# Patient Record
Sex: MALE | Race: ASIAN
[De-identification: names, ages, dates, MRNs, and addresses within clinical notes are randomized per-mention and may not be internally consistent; named-entity substitution may affect disease eponyms.]

---

## 2021-05-27 PROBLEM — Z00.00 ENCOUNTER FOR PREVENTIVE HEALTH EXAMINATION: Status: ACTIVE | Noted: 2021-05-27

## 2021-05-28 ENCOUNTER — APPOINTMENT (OUTPATIENT)
Dept: THORACIC SURGERY | Facility: CLINIC | Age: 71
End: 2021-05-28
Payer: MEDICARE

## 2021-05-28 ENCOUNTER — OUTPATIENT (OUTPATIENT)
Dept: OUTPATIENT SERVICES | Facility: HOSPITAL | Age: 71
LOS: 1 days | End: 2021-05-28
Payer: MEDICARE

## 2021-05-28 VITALS
HEART RATE: 82 BPM | HEIGHT: 65 IN | DIASTOLIC BLOOD PRESSURE: 73 MMHG | BODY MASS INDEX: 17.33 KG/M2 | OXYGEN SATURATION: 96 % | RESPIRATION RATE: 17 BRPM | SYSTOLIC BLOOD PRESSURE: 110 MMHG | WEIGHT: 104 LBS | TEMPERATURE: 96.9 F

## 2021-05-28 DIAGNOSIS — C16.9 MALIGNANT NEOPLASM OF STOMACH, UNSPECIFIED: ICD-10-CM

## 2021-05-28 DIAGNOSIS — Z87.891 PERSONAL HISTORY OF NICOTINE DEPENDENCE: ICD-10-CM

## 2021-05-28 DIAGNOSIS — Z01.818 ENCOUNTER FOR OTHER PREPROCEDURAL EXAMINATION: ICD-10-CM

## 2021-05-28 DIAGNOSIS — K92.2 GASTROINTESTINAL HEMORRHAGE, UNSPECIFIED: ICD-10-CM

## 2021-05-28 LAB
ALBUMIN SERPL ELPH-MCNC: 3.6 G/DL — SIGNIFICANT CHANGE UP (ref 3.3–5)
ALP SERPL-CCNC: 189 U/L — HIGH (ref 40–120)
ALT FLD-CCNC: 28 U/L — SIGNIFICANT CHANGE UP (ref 10–45)
ANION GAP SERPL CALC-SCNC: 8 MMOL/L — SIGNIFICANT CHANGE UP (ref 5–17)
APPEARANCE UR: CLEAR — SIGNIFICANT CHANGE UP
APTT BLD: 33.3 SEC — SIGNIFICANT CHANGE UP (ref 27.5–35.5)
AST SERPL-CCNC: 39 U/L — SIGNIFICANT CHANGE UP (ref 10–40)
BASOPHILS # BLD AUTO: 0.07 K/UL — SIGNIFICANT CHANGE UP (ref 0–0.2)
BASOPHILS NFR BLD AUTO: 0.7 % — SIGNIFICANT CHANGE UP (ref 0–2)
BILIRUB SERPL-MCNC: 0.3 MG/DL — SIGNIFICANT CHANGE UP (ref 0.2–1.2)
BILIRUB UR-MCNC: NEGATIVE — SIGNIFICANT CHANGE UP
BLD GP AB SCN SERPL QL: NEGATIVE — SIGNIFICANT CHANGE UP
BUN SERPL-MCNC: 18 MG/DL — SIGNIFICANT CHANGE UP (ref 7–23)
CALCIUM SERPL-MCNC: 9.1 MG/DL — SIGNIFICANT CHANGE UP (ref 8.4–10.5)
CHLORIDE SERPL-SCNC: 105 MMOL/L — SIGNIFICANT CHANGE UP (ref 96–108)
CHOLEST SERPL-MCNC: 148 MG/DL — SIGNIFICANT CHANGE UP
CO2 SERPL-SCNC: 29 MMOL/L — SIGNIFICANT CHANGE UP (ref 22–31)
COLOR SPEC: YELLOW — SIGNIFICANT CHANGE UP
CREAT SERPL-MCNC: 0.74 MG/DL — SIGNIFICANT CHANGE UP (ref 0.5–1.3)
DIFF PNL FLD: NEGATIVE — SIGNIFICANT CHANGE UP
EOSINOPHIL # BLD AUTO: 0.09 K/UL — SIGNIFICANT CHANGE UP (ref 0–0.5)
EOSINOPHIL NFR BLD AUTO: 0.9 % — SIGNIFICANT CHANGE UP (ref 0–6)
GLUCOSE SERPL-MCNC: 89 MG/DL — SIGNIFICANT CHANGE UP (ref 70–99)
GLUCOSE UR QL: NEGATIVE — SIGNIFICANT CHANGE UP
HCT VFR BLD CALC: 35.6 % — LOW (ref 39–50)
HDLC SERPL-MCNC: 48 MG/DL — SIGNIFICANT CHANGE UP
HGB BLD-MCNC: 10.8 G/DL — LOW (ref 13–17)
IMM GRANULOCYTES NFR BLD AUTO: 0.6 % — SIGNIFICANT CHANGE UP (ref 0–1.5)
INR BLD: 1.04 — SIGNIFICANT CHANGE UP (ref 0.88–1.16)
KETONES UR-MCNC: NEGATIVE — SIGNIFICANT CHANGE UP
LEUKOCYTE ESTERASE UR-ACNC: NEGATIVE — SIGNIFICANT CHANGE UP
LIPID PNL WITH DIRECT LDL SERPL: 85 MG/DL — SIGNIFICANT CHANGE UP
LYMPHOCYTES # BLD AUTO: 2.21 K/UL — SIGNIFICANT CHANGE UP (ref 1–3.3)
LYMPHOCYTES # BLD AUTO: 21.7 % — SIGNIFICANT CHANGE UP (ref 13–44)
MCHC RBC-ENTMCNC: 27.8 PG — SIGNIFICANT CHANGE UP (ref 27–34)
MCHC RBC-ENTMCNC: 30.3 GM/DL — LOW (ref 32–36)
MCV RBC AUTO: 91.5 FL — SIGNIFICANT CHANGE UP (ref 80–100)
MONOCYTES # BLD AUTO: 0.6 K/UL — SIGNIFICANT CHANGE UP (ref 0–0.9)
MONOCYTES NFR BLD AUTO: 5.9 % — SIGNIFICANT CHANGE UP (ref 2–14)
NEUTROPHILS # BLD AUTO: 7.14 K/UL — SIGNIFICANT CHANGE UP (ref 1.8–7.4)
NEUTROPHILS NFR BLD AUTO: 70.2 % — SIGNIFICANT CHANGE UP (ref 43–77)
NITRITE UR-MCNC: NEGATIVE — SIGNIFICANT CHANGE UP
NON HDL CHOLESTEROL: 100 MG/DL — SIGNIFICANT CHANGE UP
NRBC # BLD: 0 /100 WBCS — SIGNIFICANT CHANGE UP (ref 0–0)
PH UR: 6 — SIGNIFICANT CHANGE UP (ref 5–8)
PLATELET # BLD AUTO: 317 K/UL — SIGNIFICANT CHANGE UP (ref 150–400)
POTASSIUM SERPL-MCNC: 3.8 MMOL/L — SIGNIFICANT CHANGE UP (ref 3.5–5.3)
POTASSIUM SERPL-SCNC: 3.8 MMOL/L — SIGNIFICANT CHANGE UP (ref 3.5–5.3)
PROT SERPL-MCNC: 8.1 G/DL — SIGNIFICANT CHANGE UP (ref 6–8.3)
PROT UR-MCNC: NEGATIVE MG/DL — SIGNIFICANT CHANGE UP
PROTHROM AB SERPL-ACNC: 12.5 SEC — SIGNIFICANT CHANGE UP (ref 10.6–13.6)
RBC # BLD: 3.89 M/UL — LOW (ref 4.2–5.8)
RBC # FLD: 14 % — SIGNIFICANT CHANGE UP (ref 10.3–14.5)
RH IG SCN BLD-IMP: POSITIVE — SIGNIFICANT CHANGE UP
SODIUM SERPL-SCNC: 142 MMOL/L — SIGNIFICANT CHANGE UP (ref 135–145)
SP GR SPEC: >=1.03 — SIGNIFICANT CHANGE UP (ref 1–1.03)
TRIGL SERPL-MCNC: 74 MG/DL — SIGNIFICANT CHANGE UP
UROBILINOGEN FLD QL: 0.2 E.U./DL — SIGNIFICANT CHANGE UP
WBC # BLD: 10.17 K/UL — SIGNIFICANT CHANGE UP (ref 3.8–10.5)
WBC # FLD AUTO: 10.17 K/UL — SIGNIFICANT CHANGE UP (ref 3.8–10.5)

## 2021-05-28 PROCEDURE — 81003 URINALYSIS AUTO W/O SCOPE: CPT

## 2021-05-28 PROCEDURE — 86850 RBC ANTIBODY SCREEN: CPT

## 2021-05-28 PROCEDURE — 85730 THROMBOPLASTIN TIME PARTIAL: CPT

## 2021-05-28 PROCEDURE — 86901 BLOOD TYPING SEROLOGIC RH(D): CPT

## 2021-05-28 PROCEDURE — 99205 OFFICE O/P NEW HI 60 MIN: CPT

## 2021-05-28 PROCEDURE — 85025 COMPLETE CBC W/AUTO DIFF WBC: CPT

## 2021-05-28 PROCEDURE — 85610 PROTHROMBIN TIME: CPT

## 2021-05-28 PROCEDURE — 36415 COLL VENOUS BLD VENIPUNCTURE: CPT

## 2021-05-28 PROCEDURE — 80053 COMPREHEN METABOLIC PANEL: CPT

## 2021-05-28 PROCEDURE — 80061 LIPID PANEL: CPT

## 2021-05-28 PROCEDURE — 86900 BLOOD TYPING SEROLOGIC ABO: CPT

## 2021-05-28 RX ORDER — METOCLOPRAMIDE 5 MG/1
5 TABLET ORAL
Refills: 0 | Status: ACTIVE | COMMUNITY

## 2021-05-28 RX ORDER — ONDANSETRON HYDROCHLORIDE 24 MG/1
TABLET, FILM COATED ORAL
Refills: 0 | Status: ACTIVE | COMMUNITY

## 2021-05-28 RX ORDER — DICYCLOMINE HYDROCHLORIDE 10 MG/1
10 CAPSULE ORAL
Refills: 0 | Status: ACTIVE | COMMUNITY

## 2021-05-28 RX ORDER — OMEPRAZOLE 40 MG/1
40 CAPSULE, DELAYED RELEASE ORAL
Refills: 0 | Status: ACTIVE | COMMUNITY

## 2021-05-28 NOTE — ASSESSMENT
[FreeTextEntry1] : 70 year old male, Mandarin speaking, current smoker, with no significant PMHx,  recently admitted to  April 2021 to [facility] for GI bleed, perforated gastric ulcer s/p laparotomy, repair on 4/28/21 with pathology + for gastric adenocarcinoma c/b pelvic abscess s/p drain. Did not require blood transfusion. Recent weight loss of 15 lbs. He saw oncologist Dr. Chris Castellanos yesterday 5/27/21, H/H 10/29.9\par \par CT Abdomen/Pelvis with IV Contrast 4/19/21\par - No free air or intestinal obstruction. No intra-abdominal abscess\par - Perhaps mild thickening of the gastric antrum may reflect underlying peptic ulcer disease however limited without the presence of enteric contrast\par - Multiple hepatic cyst. \par \par MRI Abdomen w/ w/o 4/29/21\par - Numerous T2 hyperintense lesions are scattered through the liver, exhibiting no significant postcontrast enhancement, compatible with multiple hepatic cysts. \par - No definitive evidence of enhancing hepatic lesions suspicious for metastatic disease.\par - Again seen R>L pleural effusions\par - Compared with 4/25/21, there appears to be interval development of generalized small bowel distention, concerning for development of distal small bowel obstruction\par \par MRI Brain w/ w/o 4/29/21\par - No evidence of infarct, hemorrhage, or other acute intracranial abnormality.\par - No evidence of any intracranial mass lesion pathologic enhancement. \par - Scattered T-2 FLAIR hyperintensities are seen within the bilateral periventricular and left front juxtacortical white matter. Periventricular white matter lesions exhibit perpendicular orientation related to the ventricular wall, reminiscent of demyelinating lesions in the setting of multiple sclerosis\par \par CT Abdomen/Pelvis with IV Contrast 5/5/21\par - drainage catheter in place within the pelvis with near complete drainage of previous pelvic abscess collection. Persistent dilated small bowel loops seen within the pelvis measuring up to 4cm in caliber possible secondary to localized ileus or partial small bowel obstruction. Decrease in size of small amount of perihepatic and perisplenic ascites. Interval increase in size of moderate loculated right sided pleural effusion. Decrease in size of previous small left-sided pleural effusion. \par \par Patient tolerating PO intake without issues. Reports melena x past 2 days.  Denies abdominal pain, vomiting, dizziness. Advised patient to go to ED if worsening stomach pain, dysphagia, dizziness. Labs drawn today reviewed - H/H 10.8/35.6\par \par CT Abdomen/Pelvis with IV Contrast 4/19/21 and 5/5/21 reviewed with patient and his daughter, mildly thickened gastric antrum consistent peptic ulcer disease s/p surgery on 4/28/21. Patient with biopsy proven gastric adenocarcinoma. Will arrange PET and EUS with Dr. Menendez for tissue diagnosis and staging. \par \par I have reviewed the patient's medical records and diagnostic images at the time of this office consultation and have made the following recommendation.\par Plan:\par 1. PET\par 2. EUS with biopsy with Dr. Menendez\par 3. Labs drawn today\par 4. Request OP note, oncology note.

## 2021-05-28 NOTE — HISTORY OF PRESENT ILLNESS
[FreeTextEntry1] : 70 year old male, Mandarin speaking, current smoker, with no significant PMHx,  recently admitted to  April 2021 to [facility] for GI bleed,  perforated gastric ulcer s/p laparotomy, repair on 4/28/21 with pathology + for gastric adenocarcinoma c/b pelvic abscess s/p drain. Did not require blood transfusion. Recent weight loss of 15 lbs. He saw oncologist Dr. Chris Castellanos yesterday 5/27/21, H/H 10/29.9\par \par CT Abdomen/Pelvis with IV Contrast 4/19/21\par - No free air or intestinal obstruction. No intra-abdominal abscess\par - Perhaps mild thickening of the gastric antrum may reflect underlying peptic ulcer disease however limited without the presence of enteric contrast\par - Multiple hepatic cyst. \par \par MRI Abdomen w/ w/o 4/29/21\par - Numerous T2 hyperintense lesions are scattered through the liver, exhibiting no significant postcontrast enhancement, compatible with multiple hepatic cysts. \par - No definitive evidence of enhancing hepatic lesions suspicious for metastatic disease.\par - Again seen R>L pleural effusions\par - Compared with 4/25/21, there appears to be interval development of generalized small bowel distention, concerning for development of distal small bowel obstruction\par \par MRI Brain w/ w/o 4/29/21\par - No evidence of infarct, hemorrhage, or other acute intracranial abnormality.\par - No evidence of any intracranial mass lesion pathologic enhancement. \par - Scattered T-2 FLAIR hyperintensities are seen within the bilateral periventricular and left front juxtacortical white matter. Periventricular white matter lesions exhibit perpendicular orientation related to the ventricular wall, reminiscent of demyelinating lesions in the setting of multiple sclerosis\par \par CT Abdomen/Pelvis with IV Contrast 5/5/21\par - drainage catheter in place within the pelvis with near complete drainage of previous pelvic abscess collection. Persistent dilated small bowel loops seen within the pelvis measuring up to 4cm in caliber possible secondary to localized ileus or partial small bowel obstruction. Decrease in size of small amount of perihepatic and perisplenic ascites. Interval increase in size of moderate loculated right sided pleural effusion. Decrease in size of previous small left-sided pleural effusion.

## 2021-05-28 NOTE — END OF VISIT
[Time Spent: ___ minutes] : I have spent [unfilled] minutes of time on the encounter. [FreeTextEntry3] : I, USHA CUELLAR , am scribing for and in the presence of ELLIS ADRIAN the following sections: history of present illness, past medical/family/surgical/family/social history, review of systems, vital signs, physical exam, and disposition.\par  \par I personally performed the services described in the documentation, reviewed the documentation recorded by the scribe in my presence and it accurately and completely records my words and actions.

## 2021-05-28 NOTE — PHYSICAL EXAM
[General Appearance - Alert] : alert [General Appearance - In No Acute Distress] : in no acute distress [General Appearance - Well Nourished] : well nourished [General Appearance - Well Developed] : well developed [] : no respiratory distress [Respiration, Rhythm And Depth] : normal respiratory rhythm and effort [Exaggerated Use Of Accessory Muscles For Inspiration] : no accessory muscle use [Auscultation Breath Sounds / Voice Sounds] : lungs were clear to auscultation bilaterally [Apical Impulse] : the apical impulse was normal [Heart Sounds] : normal S1 and S2 [Examination Of The Chest] : the chest was normal in appearance [2+] : left 2+ [Abdomen Soft] : soft [Abdomen Tenderness] : non-tender [Abnormal Walk] : normal gait [Musculoskeletal - Swelling] : no joint swelling seen [No Focal Deficits] : no focal deficits [Oriented To Time, Place, And Person] : oriented to person, place, and time [FreeTextEntry1] : laparotomy scar

## 2021-05-28 NOTE — CONSULT LETTER
[Dear  ___] : Dear  [unfilled], [Consult Letter:] : I had the pleasure of evaluating your patient, [unfilled]. [Please see my note below.] : Please see my note below. [Consult Closing:] : Thank you very much for allowing me to participate in the care of this patient.  If you have any questions, please do not hesitate to contact me. [Sincerely,] : Sincerely, [FreeTextEntry3] : Roberto Newton MD\par Professor, Cardiovascular & Thoracic Surgery\par [employer]\par Director of the [facility] \par Director of Thoracic Surgery, [facility]\par \par [facility]\par [address]\par [facility] 4th Floor\par [place] [address]\par Phone: [phone]\par Fax: [phone]

## 2021-05-29 ENCOUNTER — INPATIENT (INPATIENT)
Facility: HOSPITAL | Age: 71
LOS: 3 days | Discharge: HOME | DRG: 374 | End: 2021-06-02
Attending: STUDENT IN AN ORGANIZED HEALTH CARE EDUCATION/TRAINING PROGRAM | Admitting: STUDENT IN AN ORGANIZED HEALTH CARE EDUCATION/TRAINING PROGRAM
Payer: COMMERCIAL

## 2021-05-29 VITALS
HEIGHT: 65 IN | WEIGHT: 104.06 LBS | SYSTOLIC BLOOD PRESSURE: 105 MMHG | TEMPERATURE: 97 F | DIASTOLIC BLOOD PRESSURE: 68 MMHG | HEART RATE: 85 BPM | OXYGEN SATURATION: 99 % | RESPIRATION RATE: 20 BRPM

## 2021-05-29 DIAGNOSIS — K92.2 GASTROINTESTINAL HEMORRHAGE, UNSPECIFIED: ICD-10-CM

## 2021-05-29 DIAGNOSIS — R74.01 ELEVATION OF LEVELS OF LIVER TRANSAMINASE LEVELS: ICD-10-CM

## 2021-05-29 DIAGNOSIS — Z29.9 ENCOUNTER FOR PROPHYLACTIC MEASURES, UNSPECIFIED: ICD-10-CM

## 2021-05-29 DIAGNOSIS — D64.9 ANEMIA, UNSPECIFIED: ICD-10-CM

## 2021-05-29 DIAGNOSIS — C16.9 MALIGNANT NEOPLASM OF STOMACH, UNSPECIFIED: ICD-10-CM

## 2021-05-29 LAB
ALBUMIN SERPL ELPH-MCNC: 2.6 G/DL — LOW (ref 3.5–5)
ALP SERPL-CCNC: 184 U/L — HIGH (ref 40–120)
ALT FLD-CCNC: 29 U/L DA — SIGNIFICANT CHANGE UP (ref 10–60)
ANION GAP SERPL CALC-SCNC: 5 MMOL/L — SIGNIFICANT CHANGE UP (ref 5–17)
APPEARANCE UR: CLEAR — SIGNIFICANT CHANGE UP
APTT BLD: 33.4 SEC — SIGNIFICANT CHANGE UP (ref 27.5–35.5)
AST SERPL-CCNC: 45 U/L — HIGH (ref 10–40)
BACTERIA # UR AUTO: ABNORMAL /HPF
BASOPHILS # BLD AUTO: 0.06 K/UL — SIGNIFICANT CHANGE UP (ref 0–0.2)
BASOPHILS # BLD AUTO: 0.07 K/UL — SIGNIFICANT CHANGE UP (ref 0–0.2)
BASOPHILS NFR BLD AUTO: 0.7 % — SIGNIFICANT CHANGE UP (ref 0–2)
BASOPHILS NFR BLD AUTO: 0.8 % — SIGNIFICANT CHANGE UP (ref 0–2)
BILIRUB SERPL-MCNC: 0.2 MG/DL — SIGNIFICANT CHANGE UP (ref 0.2–1.2)
BILIRUB UR-MCNC: NEGATIVE — SIGNIFICANT CHANGE UP
BUN SERPL-MCNC: 20 MG/DL — HIGH (ref 7–18)
CALCIUM SERPL-MCNC: 8.5 MG/DL — SIGNIFICANT CHANGE UP (ref 8.4–10.5)
CHLORIDE SERPL-SCNC: 108 MMOL/L — SIGNIFICANT CHANGE UP (ref 96–108)
CO2 SERPL-SCNC: 27 MMOL/L — SIGNIFICANT CHANGE UP (ref 22–31)
COD CRY URNS QL: ABNORMAL /HPF
COLOR SPEC: YELLOW — SIGNIFICANT CHANGE UP
COMMENT - URINE: SIGNIFICANT CHANGE UP
COMMENT - URINE: SIGNIFICANT CHANGE UP
CREAT SERPL-MCNC: 0.76 MG/DL — SIGNIFICANT CHANGE UP (ref 0.5–1.3)
DIFF PNL FLD: ABNORMAL
EOSINOPHIL # BLD AUTO: 0.05 K/UL — SIGNIFICANT CHANGE UP (ref 0–0.5)
EOSINOPHIL # BLD AUTO: 0.11 K/UL — SIGNIFICANT CHANGE UP (ref 0–0.5)
EOSINOPHIL NFR BLD AUTO: 0.6 % — SIGNIFICANT CHANGE UP (ref 0–6)
EOSINOPHIL NFR BLD AUTO: 1.3 % — SIGNIFICANT CHANGE UP (ref 0–6)
EPI CELLS # UR: ABNORMAL /HPF
GLUCOSE SERPL-MCNC: 124 MG/DL — HIGH (ref 70–99)
GLUCOSE UR QL: NEGATIVE — SIGNIFICANT CHANGE UP
HCT VFR BLD CALC: 29.4 % — LOW (ref 39–50)
HCT VFR BLD CALC: 31.3 % — LOW (ref 39–50)
HCT VFR BLD CALC: 32.3 % — LOW (ref 39–50)
HGB BLD-MCNC: 10.1 G/DL — LOW (ref 13–17)
HGB BLD-MCNC: 9.2 G/DL — LOW (ref 13–17)
HGB BLD-MCNC: 9.8 G/DL — LOW (ref 13–17)
IMM GRANULOCYTES NFR BLD AUTO: 0.3 % — SIGNIFICANT CHANGE UP (ref 0–1.5)
IMM GRANULOCYTES NFR BLD AUTO: 0.4 % — SIGNIFICANT CHANGE UP (ref 0–1.5)
INR BLD: 1.11 RATIO — SIGNIFICANT CHANGE UP (ref 0.88–1.16)
KETONES UR-MCNC: NEGATIVE — SIGNIFICANT CHANGE UP
LEUKOCYTE ESTERASE UR-ACNC: ABNORMAL
LIDOCAIN IGE QN: 213 U/L — SIGNIFICANT CHANGE UP (ref 73–393)
LYMPHOCYTES # BLD AUTO: 1.77 K/UL — SIGNIFICANT CHANGE UP (ref 1–3.3)
LYMPHOCYTES # BLD AUTO: 19.8 % — SIGNIFICANT CHANGE UP (ref 13–44)
LYMPHOCYTES # BLD AUTO: 2.21 K/UL — SIGNIFICANT CHANGE UP (ref 1–3.3)
LYMPHOCYTES # BLD AUTO: 25.2 % — SIGNIFICANT CHANGE UP (ref 13–44)
MCHC RBC-ENTMCNC: 27.9 PG — SIGNIFICANT CHANGE UP (ref 27–34)
MCHC RBC-ENTMCNC: 27.9 PG — SIGNIFICANT CHANGE UP (ref 27–34)
MCHC RBC-ENTMCNC: 28 PG — SIGNIFICANT CHANGE UP (ref 27–34)
MCHC RBC-ENTMCNC: 31.3 GM/DL — LOW (ref 32–36)
MCV RBC AUTO: 89.2 FL — SIGNIFICANT CHANGE UP (ref 80–100)
MCV RBC AUTO: 89.2 FL — SIGNIFICANT CHANGE UP (ref 80–100)
MCV RBC AUTO: 89.6 FL — SIGNIFICANT CHANGE UP (ref 80–100)
MONOCYTES # BLD AUTO: 0.52 K/UL — SIGNIFICANT CHANGE UP (ref 0–0.9)
MONOCYTES # BLD AUTO: 0.52 K/UL — SIGNIFICANT CHANGE UP (ref 0–0.9)
MONOCYTES NFR BLD AUTO: 5.8 % — SIGNIFICANT CHANGE UP (ref 2–14)
MONOCYTES NFR BLD AUTO: 5.9 % — SIGNIFICANT CHANGE UP (ref 2–14)
NEUTROPHILS # BLD AUTO: 5.84 K/UL — SIGNIFICANT CHANGE UP (ref 1.8–7.4)
NEUTROPHILS # BLD AUTO: 6.49 K/UL — SIGNIFICANT CHANGE UP (ref 1.8–7.4)
NEUTROPHILS NFR BLD AUTO: 66.5 % — SIGNIFICANT CHANGE UP (ref 43–77)
NEUTROPHILS NFR BLD AUTO: 72.7 % — SIGNIFICANT CHANGE UP (ref 43–77)
NITRITE UR-MCNC: NEGATIVE — SIGNIFICANT CHANGE UP
NRBC # BLD: 0 /100 WBCS — SIGNIFICANT CHANGE UP (ref 0–0)
OB PNL STL: POSITIVE
PH UR: 5 — SIGNIFICANT CHANGE UP (ref 5–8)
PLATELET # BLD AUTO: 259 K/UL — SIGNIFICANT CHANGE UP (ref 150–400)
PLATELET # BLD AUTO: 265 K/UL — SIGNIFICANT CHANGE UP (ref 150–400)
PLATELET # BLD AUTO: 285 K/UL — SIGNIFICANT CHANGE UP (ref 150–400)
POTASSIUM SERPL-MCNC: 3.9 MMOL/L — SIGNIFICANT CHANGE UP (ref 3.5–5.3)
POTASSIUM SERPL-SCNC: 3.9 MMOL/L — SIGNIFICANT CHANGE UP (ref 3.5–5.3)
PROT SERPL-MCNC: 7.3 G/DL — SIGNIFICANT CHANGE UP (ref 6–8.3)
PROT UR-MCNC: 30 MG/DL
PROTHROM AB SERPL-ACNC: 13.1 SEC — SIGNIFICANT CHANGE UP (ref 10.6–13.6)
RBC # BLD: 3.28 M/UL — LOW (ref 4.2–5.8)
RBC # BLD: 3.51 M/UL — LOW (ref 4.2–5.8)
RBC # BLD: 3.51 M/UL — LOW (ref 4.2–5.8)
RBC # BLD: 3.62 M/UL — LOW (ref 4.2–5.8)
RBC # FLD: 13.6 % — SIGNIFICANT CHANGE UP (ref 10.3–14.5)
RBC # FLD: 13.7 % — SIGNIFICANT CHANGE UP (ref 10.3–14.5)
RBC # FLD: 13.9 % — SIGNIFICANT CHANGE UP (ref 10.3–14.5)
RBC CASTS # UR COMP ASSIST: ABNORMAL /HPF (ref 0–2)
RETICS #: 62.1 K/UL — SIGNIFICANT CHANGE UP (ref 25–125)
RETICS/RBC NFR: 1.8 % — SIGNIFICANT CHANGE UP (ref 0.5–2.5)
SARS-COV-2 RNA SPEC QL NAA+PROBE: SIGNIFICANT CHANGE UP
SODIUM SERPL-SCNC: 140 MMOL/L — SIGNIFICANT CHANGE UP (ref 135–145)
SP GR SPEC: 1.03 — HIGH (ref 1.01–1.02)
UROBILINOGEN FLD QL: NEGATIVE — SIGNIFICANT CHANGE UP
WBC # BLD: 7.95 K/UL — SIGNIFICANT CHANGE UP (ref 3.8–10.5)
WBC # BLD: 8.78 K/UL — SIGNIFICANT CHANGE UP (ref 3.8–10.5)
WBC # BLD: 8.93 K/UL — SIGNIFICANT CHANGE UP (ref 3.8–10.5)
WBC # FLD AUTO: 7.95 K/UL — SIGNIFICANT CHANGE UP (ref 3.8–10.5)
WBC # FLD AUTO: 8.78 K/UL — SIGNIFICANT CHANGE UP (ref 3.8–10.5)
WBC # FLD AUTO: 8.93 K/UL — SIGNIFICANT CHANGE UP (ref 3.8–10.5)
WBC UR QL: SIGNIFICANT CHANGE UP /HPF (ref 0–5)

## 2021-05-29 PROCEDURE — 99223 1ST HOSP IP/OBS HIGH 75: CPT

## 2021-05-29 PROCEDURE — 99285 EMERGENCY DEPT VISIT HI MDM: CPT

## 2021-05-29 RX ORDER — TRAMADOL HYDROCHLORIDE 50 MG/1
50 TABLET ORAL EVERY 6 HOURS
Refills: 0 | Status: DISCONTINUED | OUTPATIENT
Start: 2021-05-29 | End: 2021-06-02

## 2021-05-29 RX ORDER — SODIUM CHLORIDE 9 MG/ML
1000 INJECTION INTRAMUSCULAR; INTRAVENOUS; SUBCUTANEOUS ONCE
Refills: 0 | Status: COMPLETED | OUTPATIENT
Start: 2021-05-29 | End: 2021-05-29

## 2021-05-29 RX ORDER — ACETAMINOPHEN 500 MG
650 TABLET ORAL EVERY 4 HOURS
Refills: 0 | Status: DISCONTINUED | OUTPATIENT
Start: 2021-05-29 | End: 2021-06-02

## 2021-05-29 RX ORDER — SODIUM CHLORIDE 9 MG/ML
1000 INJECTION INTRAMUSCULAR; INTRAVENOUS; SUBCUTANEOUS
Refills: 0 | Status: DISCONTINUED | OUTPATIENT
Start: 2021-05-29 | End: 2021-06-02

## 2021-05-29 RX ORDER — PANTOPRAZOLE SODIUM 20 MG/1
80 TABLET, DELAYED RELEASE ORAL ONCE
Refills: 0 | Status: COMPLETED | OUTPATIENT
Start: 2021-05-29 | End: 2021-05-29

## 2021-05-29 RX ORDER — OXYCODONE HYDROCHLORIDE 5 MG/1
5 TABLET ORAL EVERY 8 HOURS
Refills: 0 | Status: DISCONTINUED | OUTPATIENT
Start: 2021-05-29 | End: 2021-06-02

## 2021-05-29 RX ORDER — PANTOPRAZOLE SODIUM 20 MG/1
40 TABLET, DELAYED RELEASE ORAL
Refills: 0 | Status: DISCONTINUED | OUTPATIENT
Start: 2021-05-30 | End: 2021-06-02

## 2021-05-29 RX ADMIN — OXYCODONE HYDROCHLORIDE 5 MILLIGRAM(S): 5 TABLET ORAL at 23:29

## 2021-05-29 RX ADMIN — PANTOPRAZOLE SODIUM 80 MILLIGRAM(S): 20 TABLET, DELAYED RELEASE ORAL at 21:49

## 2021-05-29 RX ADMIN — SODIUM CHLORIDE 1000 MILLILITER(S): 9 INJECTION INTRAMUSCULAR; INTRAVENOUS; SUBCUTANEOUS at 16:45

## 2021-05-29 NOTE — H&P ADULT - PROBLEM SELECTOR PLAN 5
IMPROVE VTE Individual Risk Assessment  RISK                                                         Points  [  ] Previous VTE                                      3  [  ] Thrombophilia                                   2  [  ] Lower limb paralysis                         2 (unable to hold up >15 seconds)    [  ] Current Cancer                                  2       (within 6 months)  [x  ] Immobilization > 24 hrs                    1  [  ] ICU/CCU stay > 24 hrs                         1  [  ] Age > 60                                              1  scd and ppi for ppx

## 2021-05-29 NOTE — H&P ADULT - NSHPSOCIALHISTORY_GEN_ALL_CORE
Alcohol: Denied  Smoking: smoked for 45 years and quit in april after surgery   Illicit drugs: Denied

## 2021-05-29 NOTE — H&P ADULT - PROBLEM SELECTOR PLAN 1
Pt with recent hx of GIB in april s/p ex laparotomy for perforated gastric ulcer that turned out to be gastric cancer  pw dark stools  Hgb 10->9.2; unknown baseline   will send anemia panel   goal hgb > 7; will hold off any transfusion for now  s/p PPI 80 mg x1 ; will cw 40 iv bid   will cw zofran prn for nausea   will keep NPo with iv hydration   will monitor cbc q8h Pt with recent hx of GIB in april s/p ex laparotomy for perforated gastric ulcer that turned out to be gastric cancer  pw dark stools  Hgb 10->9.2; unknown baseline   CT angio showed no active bleeding   will send anemia panel   goal hgb > 7; will hold off any transfusion for now  s/p PPI 80 mg x1 ; will cw 40 iv bid   will cw zofran prn for nausea   will keep NPo with iv hydration   will monitor cbc q8h

## 2021-05-29 NOTE — H&P ADULT - NSHPPHYSICALEXAM_GEN_ALL_CORE
Vital Signs Last 24 Hrs  T(C): 36.8 (29 May 2021 20:33), Max: 36.8 (29 May 2021 20:33)  T(F): 98.2 (29 May 2021 20:33), Max: 98.2 (29 May 2021 20:33)  HR: 75 (29 May 2021 20:33) (75 - 85)  BP: 105/60 (29 May 2021 20:33) (105/60 - 105/68)  BP(mean): --  RR: 20 (29 May 2021 20:33) (20 - 20)  SpO2: 99% (29 May 2021 20:33) (99% - 99%)    Physical exam:  GENERAL: NAD, lying in bed comfortably  HEAD:  Atraumatic, Normocephalic  EYES: EOMI, PERRLA, conjunctiva and sclera clear  ENT: Moist mucous membranes  NECK: Supple, No JVD  CHEST/LUNG: Clear to auscultation bilaterally; No rales, rhonchi, wheezing, or rubs.  HEART: Regular rate and rhythm; S1+ S2+   ABDOMEN: Bowel sounds present; Soft, diffusely tender to palpate, Nondistended   EXTREMITIES:  2+ Peripheral Pulses, brisk capillary refill. No clubbing, cyanosis, or edema  NERVOUS SYSTEM:  Alert & Oriented , speech clear   MSK: FROM all 4 extremities, full and equal strength  SKIN: post surgical scar in mid abdomen, healing well.

## 2021-05-29 NOTE — ED PROVIDER NOTE - PHYSICAL EXAMINATION
Very thin, cachectic, in NAD  Moist mucosae  Pink conjunctivae  Lungs clear  Cardiac w RRR, no JVD  Abdomen soft, mild tenderness to lower abdomen, digital rectal exam with some brown/dark stool   No CVAT  No pedal edema, no calf TTP  AAOx3, no gross neuro deficits

## 2021-05-29 NOTE — ED PROVIDER NOTE - CLINICAL SUMMARY MEDICAL DECISION MAKING FREE TEXT BOX
Possible upper GI bleed vs surgical complication. Will get basic blood work, CT abdomen, and re-evaluate. Low threshold for admission.

## 2021-05-29 NOTE — H&P ADULT - PROBLEM SELECTOR PLAN 4
Pt pw alk phos of 184, mildly elevated ast   CT showed multiple hepatic cysts   will send hepatitis panel

## 2021-05-29 NOTE — ED PROVIDER NOTE - OBJECTIVE STATEMENT
69 y/o male h/o recent exploratory laparotomy for perforated gastric ulcer that turned out to be gastric cancer and also possible complication of surgery with peritonitis with 2 hospitalizations in [place] in the month of April, presenting today with dark stools since yesterday associated with nausea and lower abdominal pain. No fever. No chills. No other GI, , or respiratory symptoms. Has been following up with oncologist and surgeon in the [place] area. Scheduled for PET scans in mid-June.

## 2021-05-29 NOTE — H&P ADULT - ASSESSMENT
71 yo ex-smoker male with recent hx of GIB and ex laparotomy for perforated gastric ulcer that turned out to be gastric cancer and also possible complication of surgery with peritonitis with 2 hospitalizations in [place] in the month of April, presenting today with dark stools since yesterday associated with nausea and lower abdominal pain.

## 2021-05-29 NOTE — H&P ADULT - HISTORY OF PRESENT ILLNESS
71 yo ex-smoker male with recent hx of GIB and ex laparotomy for perforated gastric ulcer that turned out to be gastric cancer and also possible complication of surgery with peritonitis with 2 hospitalizations in [place] in the month of April, presenting today with dark stools since yesterday associated with nausea and lower abdominal pain.  Pt states pain in in lower abdomen since the last surgery and he feels bloated 10-12 hours a day. Pt has had wt loss of aprox 30 lbs. Pt has been following up with oncologist and surgeon in the [place] area and is scheduled for PET scans in mid-June. Pt denies fever chills, sob, chest pain or any other symptoms.

## 2021-05-29 NOTE — H&P ADULT - ATTENDING COMMENTS
Patient is a 70 year old male with a PMH of perforated gastric ulcer, Gastric Cancer who was BIBEMS due to abdominal pain.  Patient states that he was recently hospitalized in [place] due to abdominal pain with GI bleeding and was later found to have a GI mass. Patient states that as he has continued to have abdominal pain he decided to call EMS.    Of note, patient endorses an unintentional 30 pound weight loss over the past several months.    At time of examination in the ED, patient endorses abdominal pain.  However, patient denies any headache, dizziness, chest pain, palpitations, shortness of breath, nausea/vomiting/diarrhea.    T(C): 36.8 (05-29-21 @ 20:33), Max: 36.8 (05-29-21 @ 20:33)  T(F): 98.2 (05-29-21 @ 20:33), Max: 98.2 (05-29-21 @ 20:33)  HR: 75 (05-29-21 @ 20:33) (75 - 85)  BP: 105/60 (05-29-21 @ 20:33) (105/60 - 105/68)  RR: 20 (05-29-21 @ 20:33) (20 - 20)  SpO2: 99% (05-29-21 @ 20:33) (99% - 99%)  Wt(kg): --    P/E: As above MAR    A/P:    Abdominal Pain likely due to Peritoneal Carcinomatosis:  -CTA Abdomen/Pelvis with IV contrast identified Large malignant gastric ulcer within a large gastric mass on the anterior wall of the body of the stomach measuring 6.7 x 3.3 x 4.7 cm, No evidence of active extravasation of contrast material to suggest acute GI bleed, Omental carcinomatosis and peritoneal carcinomatosis  -FOBT= Positive  -Hgb= 9.2 <-- 10.1  -MCV= 89.6, RDW= 89.2  -Will send Iron Panel (Iron, TIBC, Ferritin) and Retic Count as add-ons to already resulted CBC  -PPI BID  -Will continue with IVF hydration  -NPO (except meds), for now until GI evaluation  -Will continue to closely monitor CBC and coags Q8H  -Will certainly need to involve GI for further recommendations    Gastric Cancer with Peritoneal Carcinomatosis:  -As above  -Will need to involve Oncology for further recommendations  -Will also likely need to involve Palliative Care    Elevated Alk Phos and Transaminitis:  -Likely due to metastatic disease process  -Will send GGT and Hepatitis Panel    Uncontrolled Blood Glucose:  -Hemoglobin A1c with AM labs  -Blood Glucose Monitoring ACHS  -Regular Insulin Sliding Scale ACHS  -NPO (except meds), for now until GI evaluation    Hypoalbuminemia:  -Nutrition Consult    GI/DVT PPx:  -Intermittent Compression Stockings  -PPI BID Patient is a 70 year old male with a PMH of perforated gastric ulcer, Gastric Cancer who was BIBEMS due to abdominal pain.  Patient states that he was recently hospitalized in [place] due to abdominal pain with GI bleeding and was later found to have a GI mass. Patient states that as he has continued to have abdominal pain he decided to call EMS.    Of note, patient endorses an unintentional 30 pound weight loss over the past several months.    At time of examination in the ED, patient endorses abdominal pain.  However, patient denies any headache, dizziness, chest pain, palpitations, shortness of breath, nausea/vomiting/diarrhea.    T(C): 36.8 (05-29-21 @ 20:33), Max: 36.8 (05-29-21 @ 20:33)  T(F): 98.2 (05-29-21 @ 20:33), Max: 98.2 (05-29-21 @ 20:33)  HR: 75 (05-29-21 @ 20:33) (75 - 85)  BP: 105/60 (05-29-21 @ 20:33) (105/60 - 105/68)  RR: 20 (05-29-21 @ 20:33) (20 - 20)  SpO2: 99% (05-29-21 @ 20:33) (99% - 99%)  Wt(kg): --    P/E: As above MAR    A/P:    Abdominal Pain likely due to Peritoneal Carcinomatosis:  -CTA Abdomen/Pelvis with IV contrast identified Large malignant gastric ulcer within a large gastric mass on the anterior wall of the body of the stomach measuring 6.7 x 3.3 x 4.7 cm, No evidence of active extravasation of contrast material to suggest acute GI bleed, Omental carcinomatosis and peritoneal carcinomatosis  -FOBT= Positive  -Hgb= 9.2 <-- 10.1  -MCV= 89.6, RDW= 89.2  -Will send Iron Panel (Iron, TIBC, Ferritin) and Retic Count as add-ons to already resulted CBC  -PPI BID  -Will continue with IVF hydration  -NPO (except meds), for now until GI evaluation  -Will continue to closely monitor CBC and coags Q8H  -Will certainly need to involve GI for further recommendations    Gastric Cancer with Peritoneal Carcinomatosis:  -As above  -Will send CEA, CA 19-9 levels  -Will need to involve Oncology for further recommendations  -Will also likely need to involve Palliative Care    Elevated Alk Phos and Transaminitis:  -Likely due to metastatic disease process  -Will send GGT and Hepatitis Panel    Uncontrolled Blood Glucose:  -Hemoglobin A1c with AM labs  -Blood Glucose Monitoring ACHS  -Regular Insulin Sliding Scale ACHS  -NPO (except meds), for now until GI evaluation    Hypoalbuminemia:  -Nutrition Consult    GI/DVT PPx:  -Intermittent Compression Stockings  -PPI BID Patient is a 70 year old male with a PMH of perforated gastric ulcer, Gastric Cancer who was BIBEMS due to abdominal pain.  Patient states that he was recently hospitalized in [place] due to abdominal pain with GI bleeding and was later found to have a GI mass. Patient states that as he has continued to have abdominal pain he decided to call EMS.    Of note, patient endorses an unintentional 30 pound weight loss over the past several months.    At time of examination in the ED, patient endorses abdominal pain.  However, patient denies any headache, dizziness, chest pain, palpitations, shortness of breath, nausea/vomiting/diarrhea.    T(C): 36.8 (05-29-21 @ 20:33), Max: 36.8 (05-29-21 @ 20:33)  T(F): 98.2 (05-29-21 @ 20:33), Max: 98.2 (05-29-21 @ 20:33)  HR: 75 (05-29-21 @ 20:33) (75 - 85)  BP: 105/60 (05-29-21 @ 20:33) (105/60 - 105/68)  RR: 20 (05-29-21 @ 20:33) (20 - 20)  SpO2: 99% (05-29-21 @ 20:33) (99% - 99%)  Wt(kg): --    P/E: As above MAR    A/P:    Abdominal Pain likely due to Peritoneal Carcinomatosis:  -CTA Abdomen/Pelvis with IV contrast identified Large malignant gastric ulcer within a large gastric mass on the anterior wall of the body of the stomach measuring 6.7 x 3.3 x 4.7 cm, No evidence of active extravasation of contrast material to suggest acute GI bleed, Omental carcinomatosis and peritoneal carcinomatosis  -FOBT= Positive  -Hgb= 9.2 <-- 10.1  -MCV= 89.6, RDW= 89.2  -Will send Iron Panel (Iron, TIBC, Ferritin) and Retic Count as add-ons to already resulted CBC  -PPI BID  -Will continue with IVF hydration  -NPO (except meds), for now until GI evaluation  -Will continue to closely monitor CBC and coags Q8H  -Will certainly need to involve GI for further recommendations    Acute GI Bleeding:  -As above    Gastric Cancer with Peritoneal Carcinomatosis:  -As above  -Will send CEA, CA 19-9 levels  -Will need to involve Oncology for further recommendations  -Will also likely need to involve Palliative Care    Elevated Alk Phos and Transaminitis:  -Likely due to metastatic disease process  -Will send GGT and Hepatitis Panel    Uncontrolled Blood Glucose:  -Hemoglobin A1c with AM labs  -Blood Glucose Monitoring ACHS  -Regular Insulin Sliding Scale ACHS  -NPO (except meds), for now until GI evaluation    Hypoalbuminemia:  -Nutrition Consult    GI/DVT PPx:  -Intermittent Compression Stockings  -PPI BID

## 2021-05-29 NOTE — H&P ADULT - PROBLEM SELECTOR PLAN 2
Pt has been following up with oncologist and surgeon in the [place] area and is scheduled for PET scans in mid-June.   will send CEA, ca 19-9  will monitor for now and pt can fu with heme/onc as outpt Pt has been following up with oncologist and surgeon in the [place] area and is scheduled for PET scans in mid-June.   CT showed Large malignant gastric ulcer within a large gastric mass on the anterior wall of the body of the stomach measuring 6.7 x 3.3 x 4.7 cm. Omental carcinomatosis and peritoneal carcinomatosis. Retroperitoneal LEFT periaortic lymphadenopathy.  will send CEA, ca 19-9  will monitor for now and pt can fu with heme/onc as outpt

## 2021-05-30 ENCOUNTER — TRANSCRIPTION ENCOUNTER (OUTPATIENT)
Age: 71
End: 2021-05-30

## 2021-05-30 DIAGNOSIS — Z98.890 OTHER SPECIFIED POSTPROCEDURAL STATES: Chronic | ICD-10-CM

## 2021-05-30 LAB
A1C WITH ESTIMATED AVERAGE GLUCOSE RESULT: 5.9 % — HIGH (ref 4–5.6)
ANION GAP SERPL CALC-SCNC: 9 MMOL/L — SIGNIFICANT CHANGE UP (ref 5–17)
BUN SERPL-MCNC: 14 MG/DL — SIGNIFICANT CHANGE UP (ref 7–18)
CALCIUM SERPL-MCNC: 8.4 MG/DL — SIGNIFICANT CHANGE UP (ref 8.4–10.5)
CANCER AG19-9 SERPL-ACNC: <2 U/ML — SIGNIFICANT CHANGE UP
CEA SERPL-MCNC: 9095 NG/ML — HIGH (ref 0–3.8)
CHLORIDE SERPL-SCNC: 109 MMOL/L — HIGH (ref 96–108)
CHOLEST SERPL-MCNC: 142 MG/DL — SIGNIFICANT CHANGE UP
CO2 SERPL-SCNC: 25 MMOL/L — SIGNIFICANT CHANGE UP (ref 22–31)
COVID-19 SPIKE DOMAIN AB INTERP: NEGATIVE — SIGNIFICANT CHANGE UP
COVID-19 SPIKE DOMAIN ANTIBODY RESULT: 0.4 U/ML — SIGNIFICANT CHANGE UP
CREAT SERPL-MCNC: 0.46 MG/DL — LOW (ref 0.5–1.3)
CULTURE RESULTS: SIGNIFICANT CHANGE UP
ESTIMATED AVERAGE GLUCOSE: 123 MG/DL — HIGH (ref 68–114)
FERRITIN SERPL-MCNC: 229 NG/ML — SIGNIFICANT CHANGE UP (ref 30–400)
FERRITIN SERPL-MCNC: 273 NG/ML — SIGNIFICANT CHANGE UP (ref 30–400)
FOLATE SERPL-MCNC: 9.3 NG/ML — SIGNIFICANT CHANGE UP
GLUCOSE BLDC GLUCOMTR-MCNC: 82 MG/DL — SIGNIFICANT CHANGE UP (ref 70–99)
GLUCOSE SERPL-MCNC: 80 MG/DL — SIGNIFICANT CHANGE UP (ref 70–99)
HAV IGM SER-ACNC: SIGNIFICANT CHANGE UP
HBV CORE IGM SER-ACNC: SIGNIFICANT CHANGE UP
HBV SURFACE AG SER-ACNC: SIGNIFICANT CHANGE UP
HCT VFR BLD CALC: 32.7 % — LOW (ref 39–50)
HCV AB S/CO SERPL IA: 0.43 S/CO — SIGNIFICANT CHANGE UP (ref 0–0.99)
HCV AB SERPL-IMP: SIGNIFICANT CHANGE UP
HDLC SERPL-MCNC: 42 MG/DL — SIGNIFICANT CHANGE UP
HGB BLD-MCNC: 10.3 G/DL — LOW (ref 13–17)
IRON SATN MFR SERPL: 12 % — LOW (ref 20–55)
IRON SATN MFR SERPL: 18 % — LOW (ref 20–55)
IRON SATN MFR SERPL: 27 UG/DL — LOW (ref 65–170)
IRON SATN MFR SERPL: 35 UG/DL — LOW (ref 65–170)
LDH SERPL L TO P-CCNC: 178 U/L — SIGNIFICANT CHANGE UP (ref 120–225)
LIPID PNL WITH DIRECT LDL SERPL: 83 MG/DL — SIGNIFICANT CHANGE UP
MAGNESIUM SERPL-MCNC: 2 MG/DL — SIGNIFICANT CHANGE UP (ref 1.6–2.6)
MCHC RBC-ENTMCNC: 28.1 PG — SIGNIFICANT CHANGE UP (ref 27–34)
MCHC RBC-ENTMCNC: 31.5 GM/DL — LOW (ref 32–36)
MCV RBC AUTO: 89.1 FL — SIGNIFICANT CHANGE UP (ref 80–100)
NON HDL CHOLESTEROL: 100 MG/DL — SIGNIFICANT CHANGE UP
NRBC # BLD: 0 /100 WBCS — SIGNIFICANT CHANGE UP (ref 0–0)
PHOSPHATE SERPL-MCNC: 2.9 MG/DL — SIGNIFICANT CHANGE UP (ref 2.5–4.5)
PLATELET # BLD AUTO: 304 K/UL — SIGNIFICANT CHANGE UP (ref 150–400)
POTASSIUM SERPL-MCNC: 3.5 MMOL/L — SIGNIFICANT CHANGE UP (ref 3.5–5.3)
POTASSIUM SERPL-SCNC: 3.5 MMOL/L — SIGNIFICANT CHANGE UP (ref 3.5–5.3)
RBC # BLD: 3.67 M/UL — LOW (ref 4.2–5.8)
RBC # BLD: 3.67 M/UL — LOW (ref 4.2–5.8)
RBC # FLD: 13.8 % — SIGNIFICANT CHANGE UP (ref 10.3–14.5)
RETICS #: 71.2 K/UL — SIGNIFICANT CHANGE UP (ref 25–125)
RETICS/RBC NFR: 1.9 % — SIGNIFICANT CHANGE UP (ref 0.5–2.5)
SARS-COV-2 IGG+IGM SERPL QL IA: 0.4 U/ML — SIGNIFICANT CHANGE UP
SARS-COV-2 IGG+IGM SERPL QL IA: NEGATIVE — SIGNIFICANT CHANGE UP
SODIUM SERPL-SCNC: 143 MMOL/L — SIGNIFICANT CHANGE UP (ref 135–145)
SPECIMEN SOURCE: SIGNIFICANT CHANGE UP
TIBC SERPL-MCNC: 197 UG/DL — LOW (ref 250–450)
TIBC SERPL-MCNC: 216 UG/DL — LOW (ref 250–450)
TRIGL SERPL-MCNC: 83 MG/DL — SIGNIFICANT CHANGE UP
TROPONIN I SERPL-MCNC: <0.015 NG/ML — SIGNIFICANT CHANGE UP (ref 0–0.04)
TSH SERPL-MCNC: 0.95 UU/ML — SIGNIFICANT CHANGE UP (ref 0.34–4.82)
UIBC SERPL-MCNC: 162 UG/DL — SIGNIFICANT CHANGE UP (ref 110–370)
UIBC SERPL-MCNC: 189 UG/DL — SIGNIFICANT CHANGE UP (ref 110–370)
VIT B12 SERPL-MCNC: 1231 PG/ML — SIGNIFICANT CHANGE UP (ref 232–1245)
WBC # BLD: 8.15 K/UL — SIGNIFICANT CHANGE UP (ref 3.8–10.5)
WBC # FLD AUTO: 8.15 K/UL — SIGNIFICANT CHANGE UP (ref 3.8–10.5)

## 2021-05-30 PROCEDURE — 99233 SBSQ HOSP IP/OBS HIGH 50: CPT

## 2021-05-30 RX ORDER — SENNA PLUS 8.6 MG/1
2 TABLET ORAL AT BEDTIME
Refills: 0 | Status: DISCONTINUED | OUTPATIENT
Start: 2021-05-30 | End: 2021-06-02

## 2021-05-30 RX ORDER — LANOLIN ALCOHOL/MO/W.PET/CERES
5 CREAM (GRAM) TOPICAL AT BEDTIME
Refills: 0 | Status: DISCONTINUED | OUTPATIENT
Start: 2021-05-30 | End: 2021-06-02

## 2021-05-30 RX ORDER — ONDANSETRON 8 MG/1
4 TABLET, FILM COATED ORAL EVERY 6 HOURS
Refills: 0 | Status: DISCONTINUED | OUTPATIENT
Start: 2021-05-30 | End: 2021-06-02

## 2021-05-30 RX ORDER — SIMETHICONE 80 MG/1
80 TABLET, CHEWABLE ORAL
Refills: 0 | Status: COMPLETED | OUTPATIENT
Start: 2021-05-30 | End: 2021-06-02

## 2021-05-30 RX ORDER — SUCRALFATE 1 G
1 TABLET ORAL
Refills: 0 | Status: DISCONTINUED | OUTPATIENT
Start: 2021-05-30 | End: 2021-05-31

## 2021-05-30 RX ORDER — CALCIUM CARBONATE 500(1250)
1 TABLET ORAL THREE TIMES A DAY
Refills: 0 | Status: DISCONTINUED | OUTPATIENT
Start: 2021-05-30 | End: 2021-06-02

## 2021-05-30 RX ADMIN — OXYCODONE HYDROCHLORIDE 5 MILLIGRAM(S): 5 TABLET ORAL at 23:22

## 2021-05-30 RX ADMIN — SODIUM CHLORIDE 70 MILLILITER(S): 9 INJECTION INTRAMUSCULAR; INTRAVENOUS; SUBCUTANEOUS at 12:33

## 2021-05-30 RX ADMIN — PANTOPRAZOLE SODIUM 40 MILLIGRAM(S): 20 TABLET, DELAYED RELEASE ORAL at 06:19

## 2021-05-30 RX ADMIN — OXYCODONE HYDROCHLORIDE 5 MILLIGRAM(S): 5 TABLET ORAL at 22:19

## 2021-05-30 RX ADMIN — OXYCODONE HYDROCHLORIDE 5 MILLIGRAM(S): 5 TABLET ORAL at 07:16

## 2021-05-30 RX ADMIN — Medication 5 MILLIGRAM(S): at 22:19

## 2021-05-30 RX ADMIN — SENNA PLUS 2 TABLET(S): 8.6 TABLET ORAL at 22:19

## 2021-05-30 RX ADMIN — SIMETHICONE 80 MILLIGRAM(S): 80 TABLET, CHEWABLE ORAL at 17:51

## 2021-05-30 RX ADMIN — Medication 1 GRAM(S): at 17:51

## 2021-05-30 RX ADMIN — OXYCODONE HYDROCHLORIDE 5 MILLIGRAM(S): 5 TABLET ORAL at 06:21

## 2021-05-30 RX ADMIN — PANTOPRAZOLE SODIUM 40 MILLIGRAM(S): 20 TABLET, DELAYED RELEASE ORAL at 17:29

## 2021-05-30 NOTE — PROGRESS NOTE ADULT - SUBJECTIVE AND OBJECTIVE BOX
Janet Magallon MD  Division of Hospital Medicine  Pager: [phone] (NS)/[phone] ([facility])    24 HOUR EVENTS/ROS: Currently pt still reporting dark stool. No F/C, abdominal pain, vomiting. Does report reduced appetite.    MEDICATIONS:  acetaminophen   Tablet .. 650 milliGRAM(s) Oral every 4 hours PRN  oxyCODONE    IR 5 milliGRAM(s) Oral every 8 hours PRN  traMADol 50 milliGRAM(s) Oral every 6 hours PRN  pantoprazole  Injectable 40 milliGRAM(s) IV Push two times a day  sodium chloride 0.9%. 1000 milliLiter(s) IV Continuous <Continuous>    PHYSICAL EXAM: reviewed vitals personally  T(C): 36.9 (05-30-21 @ 13:56), Max: 36.9 (05-30-21 @ 13:56)  HR: 73 (05-30-21 @ 13:56) (72 - 85)  BP: 115/64 (05-30-21 @ 13:56) (105/60 - 116/69)  RR: 18 (05-30-21 @ 13:56) (16 - 20)  SpO2: 99% (05-30-21 @ 13:56) (97% - 99%)  Wt(kg): --  Daily Height in cm: 165.1 (29 May 2021 15:52)    Daily   I&O's Summary    Appearance: NAD thin  male sitting up in bed, mildly jaundiced	  HEENT:   Normal oral mucosa, PERRL, EOMI	  Lymphatic: No lymphadenopathy  Cardiovascular: Normal S1 S2, No JVD, No murmurs, No edema  Respiratory: Lungs clear to auscultation	  Neuro/psych: Grossly non-focal, CN 2-12 intact, AAOX3, mood and affect normal  Gastrointestinal:  Soft, Non-tender, + BS	  Skin: No rashes, No ecchymoses, No cyanosis	  Extremities: Normal range of motion, No clubbing, cyanosis or edema  Vascular: Peripheral pulses palpable 2+ bilaterally    LABS:	  reviewed personally - anemic, CEA high, A1C 5.9%, TIBC, iron low, trops neg 	                        10.3   8.15  )-----------( 304      ( 30 May 2021 08:26 )             32.7     05-30    143  |  109<H>  |  14  ----------------------------<  80  3.5   |  25  |  0.46<L>  05-29    140  |  108  |  20<H>  ----------------------------<  124<H>  3.9   |  27  |  0.76    Ca    8.4      30 May 2021 08:26  Ca    8.5      29 May 2021 16:46  Phos  2.9     05-30  Mg     2.0     05-30    TPro  7.3  /  Alb  2.6<L>  /  TBili  0.2  /  DBili  x   /  AST  45<H>  /  ALT  29  /  AlkPhos  184<H>  05-29      BCX/UCX:   Coags: PT/INR - ( 29 May 2021 20:24 )   PT: 13.1 sec;   INR: 1.11 ratio         PTT - ( 29 May 2021 20:24 )  PTT:33.4 sec    CARDIAC MARKERS:  Troponin I, Serum: <0.015 ng/mL (05-30 @ 08:26)    CTA abdomen/pelvis  1.  Large malignant gastric ulcer within a large gastric mass on the anterior wall of the body of the stomach measuring 6.7 x 3.3 x 4.7 cm.  2.  No evidence of active extravasation of contrast material to suggest acute GI bleed.  3.  Omental carcinomatosis and peritoneal carcinomatosis as described above.  4.  Retroperitoneal LEFT periaortic lymphadenopathy.

## 2021-05-30 NOTE — PROGRESS NOTE ADULT - ASSESSMENT
69 yo M, ex-smoker, with recent hx of GIB and ex laparotomy for perforated gastric ulcer that turned out to be gastric cancer and also possible complication of surgery with peritonitis with 2 hospitalizations in [place] in the month of April, presenting with melena/GIB. Likely 2/2 metastatic GI cancer.    #Gastric cancer, metastatic  #Carcinomatosis  #GIB  #Anemia  -likely metastatic ca; CEA high, per records, pt has oncologist and surgeon - scheduled for PET scans  -will need surgery and onc input re: amenability to surgical or chemo intervention - will consult  -for now c/w protonix, serial CBCs, transfuse for Hgb <7  -c/w fluids    #Prediabetes  -pt NPO for now; will likely not require HISS    #DVT PPX  -SCDs    #Dispo  -full code  -medicine for management of anemia 2/2 GIB/gastric CA 69 yo M, ex-smoker, with recent hx of GIB and ex laparotomy for perforated gastric ulcer that turned out to be gastric cancer and also possible complication of surgery with peritonitis with 2 hospitalizations in [place] in the month of April, presenting with melena/GIB. Likely 2/2 metastatic GI cancer.    #Gastric cancer, metastatic  #Carcinomatosis  #GIB  #Anemia  -likely metastatic ca; CEA high, per records, pt has oncologist and surgeon - scheduled for PET scans  -will need surgery and onc input re: amenability to surgical or chemo intervention - will consult  -also would benefit from palliative consult  -for now c/w protonix, serial CBCs, transfuse for Hgb <7  -c/w fluids    #Prediabetes  -pt NPO for now; will likely not require HISS    #DVT PPX  -SCDs    #Dispo  -full code  -medicine for management of anemia 2/2 GIB/gastric CA  -discussed with daughter Xochitl 71 yo M, ex-smoker, with recent hx of GIB and ex laparotomy for perforated gastric ulcer that turned out to be gastric cancer and also possible complication of surgery with peritonitis with 2 hospitalizations in [place] in the month of April, presenting with melena/GIB. Likely 2/2 metastatic GI cancer.    #Gastric cancer, metastatic  #Carcinomatosis  #GIB  #Anemia  -likely metastatic ca; CEA high, per records, pt has oncologist and surgeon - scheduled for PET scans  -will need surgery and onc input re: amenability to surgical or chemo intervention - will consult  -also would benefit from palliative consult  -for now c/w protonix, serial CBCs, transfuse for Hgb <7  -c/w fluids    #Prediabetes  -A1C 5.9%; will likely not require HISS    #DVT PPX  -SCDs    #Dispo  -full code  -medicine for management of anemia 2/2 GIB/gastric CA  -discussed with daughter Xochitl

## 2021-05-30 NOTE — PATIENT PROFILE ADULT - TRANSPORTATION
"Chief Complaint   Patient presents with     RECHECK     pre op jamie mast DOS 7/31       Vitals:    07/21/20 1405   Weight: 61.2 kg (135 lb)   Height: 1.576 m (5' 2.05\")       Body mass index is 24.65 kg/m .    Alan Dozier, EMT    " no

## 2021-05-30 NOTE — DISCHARGE NOTE PROVIDER - NSDCFUADDAPPT_GEN_ALL_CORE_FT
follow up with oncology dr. Christopher this Thursday 6/3.21, office will call for time.  follow up with oncology dr. Christopher this Thursday 6/3/21, office will call for time.

## 2021-05-30 NOTE — DISCHARGE NOTE PROVIDER - HOSPITAL COURSE
71 yo ex-smoker male with recent hx of GIB and ex laparotomy for perforated gastric ulcer that turned out to be gastric cancer and also possible complication of surgery with peritonitis with 2 hospitalizations in [place] in the month of April, presenting today with dark stools since yesterday associated with nausea and lower abdominal pain. In ED, Hgb 10->9.2; unknown baseline. CT angio showed no active bleeding. Anemia panel was sent. Was started on PPI, BID, Zofran PRN for Nausea, was kept NPO & was started on IV hydration. CBC was monitored Q8hrs.         >>>>>>>>>>>>>>>>>>>>>>>>>>>>>>>>>>>>>>>>>>>>>>>>>INCOMPLETE>>>>>>>>>>>>>>>>>>>>>>>>>>>>>>>>>>>>>>>>>>>>>     71 yo ex-smoker male with recent hx of GIB and ex laparotomy for perforated gastric ulcer that turned out to be gastric cancer and also possible complication of surgery with peritonitis with 2 hospitalizations in [place] in the month of April, presenting today with dark stools since yesterday associated with nausea and lower abdominal pain.  Pt is admitted for LGIB, weight loss. CT abd shows metastatic dx with peritoneal carcinomatosis. Heme/onc [facility] following. Surgery consulted and no acute surgical intervention needed this time. Palliative following. CT chest/head ordered for distal mets. will likely d/c home when CT is negative.      69 yo ex-smoker male with recent hx of GIB and ex laparotomy for perforated gastric ulcer that turned out to be gastric cancer and also possible complication of surgery with peritonitis with 2 hospitalizations in [place] in the month of April, presenting today with dark stools since yesterday associated with nausea and lower abdominal pain.  Pt is admitted for LGIB, weight loss. CT abd shows Large malignant gastric ulcer within a large gastric mass on the anterior wall of the body of the stomach measuring 6.7 x 3.3 x 4.7 cm. No evidence of active extravasation of contrast material to suggest acute GI bleed.Omental carcinomatosis and peritoneal carcinomatosis as described above.Retroperitoneal LEFT periaortic lymphadenopathy. CEA=9,095, CA 19-9 nl. Heme/onc [facility] following. Surgery consulted and no acute surgical intervention needed this time. Palliative following.   CT chest/head ordered for distal mets. result shows Hilar adenopathy, presumably metastatic. Bilateral pleural effusions with underlying compressive atelectasis of the lower lobes. Emphysema with dominant left apical bulla. Upper abdominal adenopathy. consistent with recent CT result.  Heme/onc dr. Christopher planed for chemo/immunotherapy to avoid bowel obstruction. Scheduled 6/3/21.  Pt is examined at bedside, feeling better, reported loose BM x 2 this morning. no abdominal pain or distention on exam. Tolerated well on advanced diet.   Pt is medically optimized for discharge home with outpt follow up with oncology as discussed with attending physician.   This is a brief summary of hospitalization and please refer to medical records/progress notes for completed hospital course. 71 yo ex-smoker male with recent hx of GIB and ex laparotomy for perforated gastric ulcer that turned out to be gastric cancer and also possible complication of surgery with peritonitis with 2 hospitalizations in [place] in the month of April, presenting today with dark stools since yesterday associated with nausea and lower abdominal pain. Pt admitted for LGIB, weight loss found to be likely 2/2 malignant gastric cancer with peritoneal carcinomatosis. CT abd shows Large malignant gastric ulcer within a large gastric mass on the anterior wall of the body of the stomach measuring 6.7 x 3.3 x 4.7 cm. No evidence of active extravasation of contrast material to suggest acute GI bleed.Omental carcinomatosis and peritoneal carcinomatosis as described above.Retroperitoneal LEFT periaortic lymphadenopathy. CEA=9,095, CA 19-9 nl. Heme/onc [facility] following. Surgery consulted and no acute surgical intervention needed this time.     Palliative following.     CT chest/head ordered for distal mets. result shows Hilar adenopathy, presumably metastatic. Bilateral pleural effusions with underlying compressive atelectasis of the lower lobes. Emphysema with dominant left apical bulla. Upper abdominal adenopathy. consistent with recent CT result.  Heme/onc dr. Christopher planed for chemo/immunotherapy to avoid bowel obstruction. Scheduled 6/3/21.  Pt is examined at bedside, feeling better, reported loose BM x 2 this morning. no abdominal pain or distention on exam. Tolerated well on advanced diet. Pt was evaluated by heme/onc, who determined he would be candidate for outpt chemo based on biopsy results in [place]. Surgery evaluated pt - no acute surgical intervention.    Pt is medically optimized for discharge home with outpt follow up with oncology as discussed with attending physician.   This is a brief summary of hospitalization and please refer to medical records/progress notes for completed hospital course.

## 2021-05-30 NOTE — CONSULT NOTE ADULT - SUBJECTIVE AND OBJECTIVE BOX
69 yo ex-smoker male with recent hx of GIB and ex laparotomy for perforated gastric ulcer that turned out to be gastric cancer and also possible complication of surgery with peritonitis with 2 hospitalizations in [place] in the month of April, presenting today with dark stools associated with nausea and lower abdominal pain.  Pt states pain is in lower abdomen since the last surgery and he feels bloated 10-12 hours a day. Pt has had wt loss of aprox 30 lbs. His  CT abd/pelvic  shows an extensive gastric mass with omental and peritoneal carcinomatosis.1.  Large malignant gastric ulcer within a large gastric mass on the anterior wall of the body of the stomach measuring 6.7 x 3.3 x 4.7 cm.  2.  No evidence of active extravasation of contrast material to suggest acute GI bleed.  3.  Omental carcinomatosis and peritoneal carcinomatosis as described above.  4.  Retroperitoneal LEFT periaortic lymphadenopathy.     Oncology consult for further workup and treatment. pt denied active bleeding now and c/o abd pain/discomfort    PMH/PSH as above  Fx denied oncology h/o  05-30    143  |  109<H>  |  14  ----------------------------<  80  3.5   |  25  |  0.46<L>    Ca    8.4      30 May 2021 08:26  Phos  2.9     05-30  Mg     2.0     05-30    TPro  7.3  /  Alb  2.6<L>  /  TBili  0.2  /  DBili  x   /  AST  45<H>  /  ALT  29  /  AlkPhos  184<H>  05-29  SH no ETOH but ex-smoker quit in 4/2021    PEVital Signs Last 24 Hrs  T(C): 36.9 (30 May 2021 13:56), Max: 36.9 (30 May 2021 13:56)  T(F): 98.4 (30 May 2021 13:56), Max: 98.4 (30 May 2021 13:56)  HR: 73 (30 May 2021 13:56) (72 - 79)  BP: 115/64 (30 May 2021 13:56) (110/66 - 116/69)  BP(mean): --  RR: 18 (30 May 2021 13:56) (16 - 18)  SpO2: 99% (30 May 2021 13:56) (97% - 99%)    MEDICATIONS  (STANDING):  melatonin 5 milliGRAM(s) Oral at bedtime  pantoprazole  Injectable 40 milliGRAM(s) IV Push two times a day  senna 2 Tablet(s) Oral at bedtime  simethicone 80 milliGRAM(s) Chew two times a day  sodium chloride 0.9%. 1000 milliLiter(s) (70 mL/Hr) IV Continuous <Continuous>  sucralfate 1 Gram(s) Oral four times a day  general cachexia but AAOx3 NAD   abd distended with possible masses c/w peritoneal carcinomatosis   extrem no edema   neurology grossly intact   CBC Full  -  ( 30 May 2021 08:26 )  WBC Count : 8.15 K/uL  RBC Count : 3.67 M/uL  Hemoglobin : 10.3 g/dL  Hematocrit : 32.7 %  Platelet Count - Automated : 304 K/uL  Mean Cell Volume : 89.1 fl  Mean Cell Hemoglobin : 28.1 pg  Mean Cell Hemoglobin Concentration : 31.5 gm/dL  Auto Neutrophil # : x  Auto Lymphocyte # : x  Auto Monocyte # : x  Auto Eosinophil # : x  Auto Basophil # : x  Auto Neutrophil % : x  Auto Lymphocyte % : x  Auto Monocyte % : x  Auto Eosinophil % : x  Auto Basophil % : x  05-30    143  |  109<H>  |  14  ----------------------------<  80  3.5   |  25  |  0.46<L>    Ca    8.4      30 May 2021 08:26  Phos  2.9     05-30  Mg     2.0     05-30    TPro  7.3  /  Alb  2.6<L>  /  TBili  0.2  /  DBili  x   /  AST  45<H>  /  ALT  29  /  AlkPhos  184<H>  05-29    Home Medications:  MEDICATIONS  (STANDING):  melatonin 5 milliGRAM(s) Oral at bedtime  pantoprazole  Injectable 40 milliGRAM(s) IV Push two times a day  senna 2 Tablet(s) Oral at bedtime  simethicone 80 milliGRAM(s) Chew two times a day  sodium chloride 0.9%. 1000 milliLiter(s) (70 mL/Hr) IV Continuous <Continuous>  sucralfate 1 Gram(s) Oral four times a day

## 2021-05-30 NOTE — DISCHARGE NOTE PROVIDER - CARE PROVIDER_API CALL
Rosemary Christopher  HEMATOLOGY  [address]  [place] [address]  Phone: [phone]  Fax: [phone]  Follow Up Time: 1 week   Rosemary Christopher  [facility]  [address]  [place] [address]  Phone: [phone]  Fax: [phone]  Follow Up Time: 1 week    Harriet Chua  Phone: [phone]  Fax: (   )    -  Follow Up Time: Routine

## 2021-05-30 NOTE — DISCHARGE NOTE PROVIDER - NSDCCPCAREPLAN_GEN_ALL_CORE_FT
PRINCIPAL DISCHARGE DIAGNOSIS  Diagnosis: Gastrointestinal hemorrhage, unspecified gastrointestinal hemorrhage type  Assessment and Plan of Treatment: You were followed for recent GIB with gastric ulcer that turned out to be gastric cancer. Hemoglobin level stable. no active bleeding reported on CT of abdomen. CT showed Large malignant gastric ulcer within a large gastric mass on the anterior wall of the body of the stomach measuring 6.7 x 3.3 x 4.7 cm. Omental carcinomatosis and peritoneal carcinomatosis. Retroperitoneal LEFT periaortic lymphadenopathy.  Follow up with your primary doctor and heme/oncology after discharge.      SECONDARY DISCHARGE DIAGNOSES  Diagnosis: Gastric cancer  Assessment and Plan of Treatment: CT showed Large malignant gastric ulcer within a large gastric mass on the anterior wall of the body of the stomach measuring 6.7 x 3.3 x 4.7 cm. Omental carcinomatosis and peritoneal carcinomatosis. Retroperitoneal LEFT periaortic lymphadenopathy.  You were followed by palliative team, oncology and surgery team. no acute surgical intervention needed at this time.   continue pain medication as prescribed.  continue bowel regimen to avoid bowel obstruction.   coninue to follow up with oncology dr. Christopher on 6/3 Thursday for plan of chemotherapy.    Diagnosis: Anemia  Assessment and Plan of Treatment: Due to GI bleed. Your hemoglobin level stable around 10.8. Symptoms to report, bleeding, palpitations, fatigue, pale skin, cold skin, dizziness. Take medications as ordered by PCP      Diagnosis: Transaminitis  Assessment and Plan of Treatment: Your liver enzymes were elevated. CT abdomen resulted multiple hepatic cysts. Hepatitis negative. Follow up with your primary doctor to monitor liver enzyme     PRINCIPAL DISCHARGE DIAGNOSIS  Diagnosis: Gastrointestinal hemorrhage, unspecified gastrointestinal hemorrhage type  Assessment and Plan of Treatment: You were followed for recent GIB with gastric ulcer that turned out to be gastric cancer. Hemoglobin level stable. no active bleeding reported on CT of abdomen. CT showed Large malignant gastric ulcer within a large gastric mass on the anterior wall of the body of the stomach measuring 6.7 x 3.3 x 4.7 cm. Omental carcinomatosis and peritoneal carcinomatosis. Retroperitoneal LEFT periaortic lymphadenopathy.  underwent CT chest and head to check metastasis. Result showsHilar adenopathy, presumably metastatic. Bilateral pleural effusions with underlying compressive atelectasis of the lower lobes.  Emphysema with dominant left apical bulla.  You were followed by heme/oncology.   continue iron tablet as recommended.   Follow up with your primary doctor and heme/oncology after discharge.   Scheduled on 6/3/21 with dr. Christopher. Office will call for time.      SECONDARY DISCHARGE DIAGNOSES  Diagnosis: Gastric cancer  Assessment and Plan of Treatment: CT showed Large malignant gastric ulcer within a large gastric mass on the anterior wall of the body of the stomach measuring 6.7 x 3.3 x 4.7 cm. Omental carcinomatosis and peritoneal carcinomatosis. Retroperitoneal LEFT periaortic lymphadenopathy.  You were followed by palliative team, oncology and surgery team. no acute surgical intervention needed at this time.   underwent CT chest and head to check metastasis. Result showsHilar adenopathy, presumably metastatic. Bilateral pleural effusions with underlying compressive atelectasis of the lower lobes.  Emphysema with dominant left apical bulla.  continue pain medication as prescribed.  continue bowel regimen to avoid bowel obstruction.   coninue to follow up with oncology dr. Christopher on 6/3 Thursday for plan of starting preparation chemotherapy.    Diagnosis: Transaminitis  Assessment and Plan of Treatment: Your liver enzymes were elevated. CT abdomen resulted multiple hepatic cysts. Hepatitis negative. Follow up with your primary doctor to monitor liver enzyme    Diagnosis: Anemia  Assessment and Plan of Treatment: Due to GI bleed. Your hemoglobin level stable around 10.8. Symptoms to report, bleeding, palpitations, fatigue, pale skin, cold skin, dizziness. Take medications as ordered by PCP

## 2021-05-30 NOTE — DISCHARGE NOTE PROVIDER - NSDCTIMEBILLING_GEN_ALL_CORE
minutes spent on total encounter; more than 50% of the visit was spent counseling and / or coordinating care by the attending physician.    The necessity of the time spent during the encounter on this date of service was due to:

## 2021-05-30 NOTE — CONSULT NOTE ADULT - ASSESSMENT
FULL NOTE TO FOLLOW  69 yo ex-smoker male with recent hx of GIB and ex laparotomy for perforated gastric ulcer that turned out to be gastric cancer and also possible complication of surgery with peritonitis with 2 hospitalizations in [place] in the month of April, presenting today with dark stools since yesterday associated with nausea and lower abdominal pain. Surgical oncology was consulted because pt's CTA done to evaluate GI shows an extensive gastric mass with omental and peritoneal carcinomatosis. . Additional history from daughter and son (Kelly Cole #[id number]). Additional lab work and test results from [place] will be provided tomorrow AM. CEA is 9095    f/up labs inc TM   Heme/Onc consult  Palliative consult   Other care/mgmt per primary team   Surgical oncology will follow 69 yo ex-smoker male with recent hx of GIB and ex laparotomy for perforated gastric ulcer that turned out to be gastric cancer and also possible complication of surgery with peritonitis with 2 hospitalizations in [place] in the month of April, presenting today with dark stools since yesterday associated with nausea and lower abdominal pain. Surgical oncology was consulted because pt's CTA done to evaluate GI shows an extensive gastric mass with omental and peritoneal carcinomatosis. . Additional history from daughter and son (Kelly Cole #[phone]). Additional lab work and test results from [place] will be provided tomorrow AM. CEA is 9095. Pt has metastatic disease    f/up labs inc TM   Will benefit IR Biopsy of the gastric mass/carcinomatosis/lymphadenopathy  Heme/Onc consult  Palliative consult   Other care/ mgmt per primary team   Surgical oncology will follow

## 2021-05-30 NOTE — DISCHARGE NOTE PROVIDER - PROVIDER TOKENS
PROVIDER:[TOKEN:[[id number]],FOLLOWUP:[1 week]] PROVIDER:[TOKEN:[[id number]],FOLLOWUP:[1 week]],FREE:[LAST:[Toma],FIRST:[Harriet],PHONE:[phone]],FAX:[(   [phone]    -],ADDRESS:[PMD],FOLLOWUP:[Routine]]

## 2021-05-30 NOTE — CONSULT NOTE ADULT - SUBJECTIVE AND OBJECTIVE BOX
SURGICAL ONCOLOGY CONSULT NOTE     chief complaint of Dark stool (30 May 2021 15:10)      HPI:  69 yo ex-smoker male with recent hx of GIB and ex laparotomy for perforated gastric ulcer that turned out to be gastric cancer and also possible complication of surgery with peritonitis with 2 hospitalizations in [place] in the month of April, presenting today with dark stools since yesterday associated with nausea and lower abdominal pain.  Pt states pain in in lower abdomen since the last surgery and he feels bloated 10-12 hours a day. Pt has had wt loss of aprox 30 lbs. Pt has been following up with oncologist and surgeon in the [place] area and is scheduled for PET scans in mid-. Pt denies fever chills, sob, chest pain or any other symptoms.  (29 May 2021 22:44)      PAST MEDICAL & SURGICAL HISTORY:  Gastric bleeding    Gastric mass        Review of Systems:    I have reviewed 9 systems with the patient and the only positive findings were     MEDICATIONS  (STANDING):  melatonin 5 milliGRAM(s) Oral at bedtime  pantoprazole  Injectable 40 milliGRAM(s) IV Push two times a day  senna 2 Tablet(s) Oral at bedtime  simethicone 80 milliGRAM(s) Chew two times a day  sodium chloride 0.9%. 1000 milliLiter(s) (70 mL/Hr) IV Continuous <Continuous>  sucralfate 1 Gram(s) Oral four times a day    MEDICATIONS  (PRN):  acetaminophen   Tablet .. 650 milliGRAM(s) Oral every 4 hours PRN Mild Pain (1 - 3)  calcium carbonate    500 mG (Tums) Chewable 1 Tablet(s) Chew three times a day PRN Heartburn  ondansetron Injectable 4 milliGRAM(s) IV Push every 6 hours PRN Nausea and/or Vomiting  oxyCODONE    IR 5 milliGRAM(s) Oral every 8 hours PRN Severe Pain (7 - 10)  traMADol 50 milliGRAM(s) Oral every 6 hours PRN Moderate Pain (4 - 6)      Allergies    No Known Allergies    Intolerances        Social History:  Alcohol: Denied  Smoking: smoked for 45 years and quit in april after surgery   Illicit drugs: Denied (29 May 2021 22:44)      FAMILY HISTORY:      Vital Signs Last 24 Hrs  T(C): 36.9 (30 May 2021 13:56), Max: 36.9 (30 May 2021 13:56)  T(F): 98.4 (30 May 2021 13:56), Max: 98.4 (30 May 2021 13:56)  HR: 73 (30 May 2021 13:56) (72 - 79)  BP: 115/64 (30 May 2021 13:56) (105/60 - 116/69)  BP(mean): --  RR: 18 (30 May 2021 13:56) (16 - 20)  SpO2: 99% (30 May 2021 13:56) (97% - 99%)    Physical Exam:  Vital Signs Last 24 Hrs  T(C): 36.9 (30 May 2021 13:56), Max: 36.9 (30 May 2021 13:56)  T(F): 98.4 (30 May 2021 13:56), Max: 98.4 (30 May 2021 13:56)  HR: 73 (30 May 2021 13:56) (72 - 79)  BP: 115/64 (30 May 2021 13:56) (105/60 - 116/69)  BP(mean): --  RR: 18 (30 May 2021 13:56) (16 - 20)  SpO2: 99% (30 May 2021 13:56) (97% - 99%)    General:  A&Ox3,Appears stated age, No acute distress,  Head: NC/AT  EENT: PERRLA. EOMI. Conjunctiva and sclera clear. Pharynx clear.  Neck: Supple. No JVD  Lungs: CTA B/l. Nonlabored Respirations  CV: +S1S2, RRR  Abdomen: Soft, Nondistended,  Nontender, no guarding, no rebound  Extremities: Warm and well perfused. 2+ peripheral pulses b/l. Calf soft, nontender b/l. No pedal edema.            LABS:                        10.3   8.15  )-----------( 304      ( 30 May 2021 08:26 )             32.7     05-30    143  |  109<H>  |  14  ----------------------------<  80  3.5   |  25  |  0.46<L>    Ca    8.4      30 May 2021 08:26  Phos  2.9     05-30  Mg     2.0     05-30    TPro  7.3  /  Alb  2.6<L>  /  TBili  0.2  /  DBili  x   /  AST  45<H>  /  ALT  29  /  AlkPhos  184<H>  05-29    LIVER FUNCTIONS - ( 29 May 2021 16:46 )  Alb: 2.6 g/dL / Pro: 7.3 g/dL / ALK PHOS: 184 U/L / ALT: 29 U/L DA / AST: 45 U/L / GGT: x           PT/INR - ( 29 May 2021 20:24 )   PT: 13.1 sec;   INR: 1.11 ratio         PTT - ( 29 May 2021 20:24 )  PTT:33.4 sec  Urinalysis Basic - ( 29 May 2021 16:59 )    Color: Yellow / Appearance: Clear / S.030 / pH: x  Gluc: x / Ketone: Negative  / Bili: Negative / Urobili: Negative   Blood: x / Protein: 30 mg/dL / Nitrite: Negative   Leuk Esterase: Trace / RBC: 2-5 /HPF / WBC 3-5 /HPF   Sq Epi: x / Non Sq Epi: Occasional /HPF / Bacteria: Few /HPF        RADIOLOGY & ADDITIONAL STUDIES:  SURGICAL ONCOLOGY CONSULT NOTE     chief complaint of Dark stool (30 May 2021 15:10)      HPI:  71 yo ex-smoker male with recent hx of GIB and ex laparotomy for perforated gastric ulcer that turned out to be gastric cancer and also possible complication of surgery with peritonitis with 2 hospitalizations in [place] in the month of April, presenting today with dark stools since yesterday associated with nausea and lower abdominal pain.  Pt states pain is in lower abdomen since the last surgery and he feels bloated 10-12 hours a day. Pt has had wt loss of aprox 30 lbs. Pt has been following up with oncologist and surgeon in the [place] area and is scheduled for PET scans in mid-. Pt denies fever chills, sob, chest pain or any other symptoms.  (29 May 2021 22:44)    Surgical oncology was consulted because pt's CTA done to evaluate GI shows an extensive gastric mass with omental and peritoneal carcinomatosis. pt presently admits non specific abdominal pain, nausea but no vomiting . Additional history from daughter and son (Kelly Cole #[id number]). Additional lab work and test results from [place] will be provided tomorrow AM. No other complaints at this time        PAST MEDICAL & SURGICAL HISTORY:  Gastric bleeding    Gastric mass        Review of Systems:    I have reviewed 9 systems with the patient and the only positive findings were     MEDICATIONS  (STANDING):  melatonin 5 milliGRAM(s) Oral at bedtime  pantoprazole  Injectable 40 milliGRAM(s) IV Push two times a day  senna 2 Tablet(s) Oral at bedtime  simethicone 80 milliGRAM(s) Chew two times a day  sodium chloride 0.9%. 1000 milliLiter(s) (70 mL/Hr) IV Continuous <Continuous>  sucralfate 1 Gram(s) Oral four times a day    MEDICATIONS  (PRN):  acetaminophen   Tablet .. 650 milliGRAM(s) Oral every 4 hours PRN Mild Pain (1 - 3)  calcium carbonate    500 mG (Tums) Chewable 1 Tablet(s) Chew three times a day PRN Heartburn  ondansetron Injectable 4 milliGRAM(s) IV Push every 6 hours PRN Nausea and/or Vomiting  oxyCODONE    IR 5 milliGRAM(s) Oral every 8 hours PRN Severe Pain (7 - 10)  traMADol 50 milliGRAM(s) Oral every 6 hours PRN Moderate Pain (4 - 6)      Allergies    No Known Allergies    Intolerances        Social History:  Alcohol: Denied  Smoking: smoked for 45 years and quit in april after surgery   Illicit drugs: Denied (29 May 2021 22:44)      FAMILY HISTORY:      Vital Signs Last 24 Hrs  T(C): 36.9 (30 May 2021 13:56), Max: 36.9 (30 May 2021 13:56)  T(F): 98.4 (30 May 2021 13:56), Max: 98.4 (30 May 2021 13:56)  HR: 73 (30 May 2021 13:56) (72 - 79)  BP: 115/64 (30 May 2021 13:56) (105/60 - 116/69)  BP(mean): --  RR: 18 (30 May 2021 13:56) (16 - 20)  SpO2: 99% (30 May 2021 13:56) (97% - 99%)    Physical Exam:  Vital Signs Last 24 Hrs  T(C): 36.9 (30 May 2021 13:56), Max: 36.9 (30 May 2021 13:56)  T(F): 98.4 (30 May 2021 13:56), Max: 98.4 (30 May 2021 13:56)  HR: 73 (30 May 2021 13:56) (72 - 79)  BP: 115/64 (30 May 2021 13:56) (105/60 - 116/69)  BP(mean): --  RR: 18 (30 May 2021 13:56) (16 - 20)  SpO2: 99% (30 May 2021 13:56) (97% - 99%)    General:  A&Ox3,Appears stated age, No acute distress,  Head: NC/AT  EENT: PERRLA. EOMI. Conjunctiva and sclera clear. Pharynx clear.  Neck: Supple. No JVD  Lungs: CTA B/l. Nonlabored Respirations  CV: +S1S2, RRR  Abdomen: Soft, Nondistended,  Nontender, no guarding, no rebound  Extremities: Warm and well perfused. 2+ peripheral pulses b/l. Calf soft, nontender b/l. No pedal edema.            LABS:                        10.3   8.15  )-----------( 304      ( 30 May 2021 08:26 )             32.7     05-30    143  |  109<H>  |  14  ----------------------------<  80  3.5   |  25  |  0.46<L>    Ca    8.4      30 May 2021 08:26  Phos  2.9       Mg     2.0         TPro  7.3  /  Alb  2.6<L>  /  TBili  0.2  /  DBili  x   /  AST  45<H>  /  ALT  29  /  AlkPhos  184<H>      LIVER FUNCTIONS - ( 29 May 2021 16:46 )  Alb: 2.6 g/dL / Pro: 7.3 g/dL / ALK PHOS: 184 U/L / ALT: 29 U/L DA / AST: 45 U/L / GGT: x           PT/INR - ( 29 May 2021 20:24 )   PT: 13.1 sec;   INR: 1.11 ratio         PTT - ( 29 May 2021 20:24 )  PTT:33.4 sec  Urinalysis Basic - ( 29 May 2021 16:59 )    Color: Yellow / Appearance: Clear / S.030 / pH: x  Gluc: x / Ketone: Negative  / Bili: Negative / Urobili: Negative   Blood: x / Protein: 30 mg/dL / Nitrite: Negative   Leuk Esterase: Trace / RBC: 2-5 /HPF / WBC 3-5 /HPF   Sq Epi: x / Non Sq Epi: Occasional /HPF / Bacteria: Few /HPF        RADIOLOGY & ADDITIONAL STUDIES:    < from: CT Angio Abdomen and Pelvis w/ IV Cont (21 @ 19:06) >  FINDINGS:  LOWER CHEST: RIGHT lower lobe atelectasis. RIGHT pleural effusion.    LIVER: Multiple hepatic cysts.  BILE DUCTS: Normal caliber.  GALLBLADDER: Within normal limits.  SPLEEN: Within normal limits.  PANCREAS: Within normal limits.  ADRENALS: Within normal limits.  KIDNEYS/URETERS: Within normal limits.    BLADDER: Within normal limits.  REPRODUCTIVE ORGANS: Enlarged prostate.    BOWEL: Markedly abnormal stomach with large anterior gastric wall mass with ulceration. The mass measures 6.7 x 3.3 x 4.7 cm. Evaluation is somewhat limited by extensive respiratory motion artifact. No evidence of active extravasation of contrast material to suggest acute GI bleed.  There are multiple nodules within the gastrocolic ligament/lesser omentum consistent with carcinomatosis. For example there is a 10 mm nodule seen on the RIGHT (11-63). Additional omental nodule measures 1.6 x 1.2 cm, also to the RIGHT of midline (11-57). Probable peritoneal implants along the RIGHT paracolic gutter. Large peripancreatic lymph node versus peritoneal implant between the duodenal bulb and head of the pancreas (11-44). Malignant portacaval lymphadenopathy.    No evidence of bowel obstruction. Appendix is not visualized. No evidence of inflammation in the pericecal region.  PERITONEUM: Multiple peritoneal implants as described above. Peritoneal carcinomatosis.  VESSELS: Within normal limits.  RETROPERITONEUM/LYMPH NODES: LEFT para-aortic lymphadenopathy at the level of the LEFT renal vein.  ABDOMINAL WALL: Within normal limits.  BONES: Within normal limits.    IMPRESSION:  1.  Large malignant gastric ulcer within a large gastric mass on the anterior wall of the body of the stomach measuring 6.7 x 3.3 x 4.7 cm.  2.  No evidence of active extravasation of contrast material to suggest acute GI bleed.  3.  Omental carcinomatosis and peritoneal carcinomatosis as described above.  4.  Retroperitoneal LEFT periaortic lymphadenopathy.      < end of copied text >

## 2021-05-30 NOTE — DISCHARGE NOTE PROVIDER - DETAILS OF MALNUTRITION DIAGNOSIS/DIAGNOSES
This patient has been assessed with a concern for Malnutrition and was treated during this hospitalization for the following Nutrition diagnosis/diagnoses:     -  06/01/2021: Severe protein-calorie malnutrition   -  06/01/2021: Underweight (BMI < 19)

## 2021-05-30 NOTE — DISCHARGE NOTE PROVIDER - NSDCMRMEDTOKEN_GEN_ALL_CORE_FT
calcium carbonate 500 mg (200 mg elemental calcium) oral tablet, chewable: 1 tab(s) orally 3 times a day, As needed, Heartburn  Feosol 200 mg (65 mg elemental iron) oral tablet: 1 tab(s) orally once a day   ondansetron 4 mg oral disintegrating strip: 1 each orally every 8 hours, As Needed -for nausea MDD:3  oxyCODONE 5 mg oral tablet: 1 tab(s) orally every 8 hours, As needed, Severe Pain (7 - 10) MDD:3  Protonix 40 mg oral delayed release tablet: 1 tab(s) orally once a day   senna oral tablet: 2 tab(s) orally once a day (at bedtime)  sucralfate 1 g/10 mL oral suspension: 10 milliliter(s) orally 4 times a day  traMADol 50 mg oral tablet: 1 tab(s) orally every 6 hours, As needed, Moderate Pain (4 - 6) MDD:4   calcium carbonate 500 mg (200 mg elemental calcium) oral tablet, chewable: 1 tab(s) orally 3 times a day, As needed, Heartburn  Ensure nutritional drink:   Feosol 200 mg (65 mg elemental iron) oral tablet: 1 tab(s) orally once a day   ondansetron 4 mg oral disintegrating strip: 1 each orally every 8 hours, As Needed -for nausea MDD:3  oxyCODONE 5 mg oral tablet: 1 tab(s) orally every 8 hours, As needed, Severe Pain (7 - 10) MDD:3  Protonix 40 mg oral delayed release tablet: 1 tab(s) orally once a day   senna oral tablet: 2 tab(s) orally once a day (at bedtime)  sucralfate 1 g/10 mL oral suspension: 10 milliliter(s) orally 4 times a day  traMADol 50 mg oral tablet: 1 tab(s) orally every 6 hours, As needed, Moderate Pain (4 - 6) MDD:4

## 2021-05-30 NOTE — ED ADULT NURSE REASSESSMENT NOTE - NS ED NURSE REASSESS COMMENT FT1
pt aaox3,not in distress,with saline lock intact,no redness,no swelling noted, verbal report given to nurse brooke,used pacific  ID no. [id number] Tonya

## 2021-05-31 LAB
ANION GAP SERPL CALC-SCNC: 12 MMOL/L — SIGNIFICANT CHANGE UP (ref 5–17)
BUN SERPL-MCNC: 12 MG/DL — SIGNIFICANT CHANGE UP (ref 7–18)
CALCIUM SERPL-MCNC: 8.3 MG/DL — LOW (ref 8.4–10.5)
CHLORIDE SERPL-SCNC: 105 MMOL/L — SIGNIFICANT CHANGE UP (ref 96–108)
CO2 SERPL-SCNC: 23 MMOL/L — SIGNIFICANT CHANGE UP (ref 22–31)
CREAT SERPL-MCNC: 0.5 MG/DL — SIGNIFICANT CHANGE UP (ref 0.5–1.3)
GLUCOSE BLDC GLUCOMTR-MCNC: 112 MG/DL — HIGH (ref 70–99)
GLUCOSE BLDC GLUCOMTR-MCNC: 120 MG/DL — HIGH (ref 70–99)
GLUCOSE BLDC GLUCOMTR-MCNC: 79 MG/DL — SIGNIFICANT CHANGE UP (ref 70–99)
GLUCOSE SERPL-MCNC: 100 MG/DL — HIGH (ref 70–99)
HCT VFR BLD CALC: 32.7 % — LOW (ref 39–50)
HCV AB S/CO SERPL IA: 0.47 S/CO — SIGNIFICANT CHANGE UP (ref 0–0.99)
HCV AB SERPL-IMP: SIGNIFICANT CHANGE UP
HGB BLD-MCNC: 10.4 G/DL — LOW (ref 13–17)
MCHC RBC-ENTMCNC: 28 PG — SIGNIFICANT CHANGE UP (ref 27–34)
MCHC RBC-ENTMCNC: 31.8 GM/DL — LOW (ref 32–36)
MCV RBC AUTO: 87.9 FL — SIGNIFICANT CHANGE UP (ref 80–100)
NRBC # BLD: 0 /100 WBCS — SIGNIFICANT CHANGE UP (ref 0–0)
PLATELET # BLD AUTO: 307 K/UL — SIGNIFICANT CHANGE UP (ref 150–400)
POTASSIUM SERPL-MCNC: 3.4 MMOL/L — LOW (ref 3.5–5.3)
POTASSIUM SERPL-SCNC: 3.4 MMOL/L — LOW (ref 3.5–5.3)
RBC # BLD: 3.72 M/UL — LOW (ref 4.2–5.8)
RBC # FLD: 13.5 % — SIGNIFICANT CHANGE UP (ref 10.3–14.5)
SODIUM SERPL-SCNC: 140 MMOL/L — SIGNIFICANT CHANGE UP (ref 135–145)
WBC # BLD: 7.99 K/UL — SIGNIFICANT CHANGE UP (ref 3.8–10.5)
WBC # FLD AUTO: 7.99 K/UL — SIGNIFICANT CHANGE UP (ref 3.8–10.5)

## 2021-05-31 PROCEDURE — 99232 SBSQ HOSP IP/OBS MODERATE 35: CPT

## 2021-05-31 RX ORDER — SUCRALFATE 1 G
1 TABLET ORAL
Refills: 0 | Status: DISCONTINUED | OUTPATIENT
Start: 2021-05-31 | End: 2021-06-02

## 2021-05-31 RX ORDER — POTASSIUM CHLORIDE 20 MEQ
20 PACKET (EA) ORAL ONCE
Refills: 0 | Status: COMPLETED | OUTPATIENT
Start: 2021-05-31 | End: 2021-05-31

## 2021-05-31 RX ORDER — POLYETHYLENE GLYCOL 3350 17 G/17G
17 POWDER, FOR SOLUTION ORAL DAILY
Refills: 0 | Status: DISCONTINUED | OUTPATIENT
Start: 2021-05-31 | End: 2021-06-02

## 2021-05-31 RX ADMIN — SENNA PLUS 2 TABLET(S): 8.6 TABLET ORAL at 21:20

## 2021-05-31 RX ADMIN — Medication 20 MILLIEQUIVALENT(S): at 08:28

## 2021-05-31 RX ADMIN — Medication 1 GRAM(S): at 01:44

## 2021-05-31 RX ADMIN — Medication 1 GRAM(S): at 17:10

## 2021-05-31 RX ADMIN — Medication 5 MILLIGRAM(S): at 21:20

## 2021-05-31 RX ADMIN — PANTOPRAZOLE SODIUM 40 MILLIGRAM(S): 20 TABLET, DELAYED RELEASE ORAL at 17:10

## 2021-05-31 RX ADMIN — SIMETHICONE 80 MILLIGRAM(S): 80 TABLET, CHEWABLE ORAL at 06:03

## 2021-05-31 RX ADMIN — PANTOPRAZOLE SODIUM 40 MILLIGRAM(S): 20 TABLET, DELAYED RELEASE ORAL at 06:02

## 2021-05-31 RX ADMIN — Medication 1 GRAM(S): at 06:03

## 2021-05-31 RX ADMIN — SIMETHICONE 80 MILLIGRAM(S): 80 TABLET, CHEWABLE ORAL at 17:10

## 2021-05-31 RX ADMIN — Medication 1 GRAM(S): at 12:23

## 2021-05-31 NOTE — PROGRESS NOTE ADULT - SUBJECTIVE AND OBJECTIVE BOX
Surgical Oncology    Subjective:  Pt resting comfortably. No overnight events.  Tumor markers reviewed.    T(C): 36.7 (05-31-21 @ 04:56), Max: 36.9 (05-30-21 @ 13:56)  HR: 76 (05-31-21 @ 04:56) (73 - 76)  BP: 112/67 (05-31-21 @ 04:56) (110/62 - 115/64)  RR: 16 (05-31-21 @ 04:56) (16 - 18)  SpO2: 98% (05-31-21 @ 04:56) (98% - 99%)    Physical:  Gen: NAD                          10.4   7.99  )-----------( 307      ( 31 May 2021 07:40 )             32.7     05-31    140  |  105  |  12  ----------------------------<  100<H>  3.4<L>   |  23  |  0.50    Ca    8.3<L>      31 May 2021 07:40  Phos  2.9     05-30  Mg     2.0     05-30    TPro  7.3  /  Alb  2.6<L>  /  TBili  0.2  /  DBili  x   /  AST  45<H>  /  ALT  29  /  AlkPhos  184<H>  05-29    Carcinoembryonic Antigen: 9095.0: Test Repeated  Method: Roche Electrochemiluminescence Immuno Assay  Values obtained with different assay methods or kits cannot be  used interchangeably.  Results cannot be interpreted as absolute evidence of the presence  or absence of malignant disease.   CEA Normal Ranges   _________________   Non-smoker: less than 3.9 ng/mL       Smoker: less than 5.5 ng/mL ng/mL (05.30.21 @ 11:33)    Cancer Antigen, GI Ca 19-9: <2: Test Repeated  Method: Roche Electrochemiluminescence Immuno Assay  Values obtained with different assay methods or kits cannot be  used interchangeably.  Results cannot be interpreted as absolute evidence of the presence  or absence of malignant disease. U/mL (05.30.21 @ 11:33)

## 2021-05-31 NOTE — PROGRESS NOTE ADULT - SUBJECTIVE AND OBJECTIVE BOX
pt seen and examined . c/o abd discomfort and constipation. Pt daughter bring his records from prior surgery. with pathology showed poor differentiated adenocarcinoma with negative Her-2.  Vital Signs Last 24 Hrs  T(C): 36.2 (31 May 2021 14:17), Max: 36.7 (30 May 2021 21:26)  T(F): 97.2 (31 May 2021 14:17), Max: 98.1 (30 May 2021 21:26)  HR: 72 (31 May 2021 14:17) (72 - 76)  BP: 110/62 (31 May 2021 14:17) (110/62 - 112/67)  BP(mean): --  RR: 17 (31 May 2021 14:17) (16 - 18)  SpO2: 98% (31 May 2021 14:17) (98% - 98%)  PE no changes   CBC Full  -  ( 31 May 2021 07:40 )  WBC Count : 7.99 K/uL  RBC Count : 3.72 M/uL  Hemoglobin : 10.4 g/dL  Hematocrit : 32.7 %  Platelet Count - Automated : 307 K/uL  Mean Cell Volume : 87.9 fl  Mean Cell Hemoglobin : 28.0 pg  Mean Cell Hemoglobin Concentration : 31.8 gm/dL  Auto Neutrophil # : x  Auto Lymphocyte # : x  Auto Monocyte # : x  Auto Eosinophil # : x  Auto Basophil # : x  Auto Neutrophil % : x  Auto Lymphocyte % : x  Auto Monocyte % : x  Auto Eosinophil % : x  Auto Basophil % : x  05-31  Ct IMPRESSION:  1.  Large malignant gastric ulcer within a large gastric mass on the anterior wall of the body of the stomach measuring 6.7 x 3.3 x 4.7 cm.  2.  No evidence of active extravasation of contrast material to suggest acute GI bleed.  3.  Omental carcinomatosis and peritoneal carcinomatosis as described above.  4.  Retroperitoneal LEFT periaortic lymphadenopathy.    140  |  105  |  12  ----------------------------<  100<H>  3.4<L>   |  23  |  0.50    Ca    8.3<L>      31 May 2021 07:40  Phos  2.9     05-30  Mg     2.0     05-30

## 2021-05-31 NOTE — PROGRESS NOTE ADULT - ASSESSMENT
70y.o. Male with metastatic gastric Ca    -Rec IR bx of accessible lesion  -Obtain records from [place]  -No acute surgical intervention indicated at present time as pt does not have active bleed or obstruction  -Onc f/u

## 2021-05-31 NOTE — PROGRESS NOTE ADULT - ASSESSMENT
71 yo M, ex-smoker, with recent hx of GIB and ex laparotomy for perforated gastric ulcer that turned out to be gastric cancer and also possible complication of surgery with peritonitis with 2 hospitalizations in [place] in the month of April, presenting with melena/GIB. Likely 2/2 metastatic GI cancer.    #Gastric cancer, metastatic  #Carcinomatosis  #GIB  #Anemia  -likely metastatic ca; CEA high, per records, pt has oncologist and surgeon but after discussing with family (daughter Xochitl and son Virgil) yesterday, they want to switch to [facility] providers. Heme/onc and surgery consulted  -per surgery no immediate intervention - will consult IR for bx  -per heme/onc may be chemo candidate  -also would benefit from palliative consult  -for now c/w protonix, serial CBCs, transfuse for Hgb <7  -c/w fluids    #Prediabetes  -A1C 5.9%; will likely not require HISS    #DVT PPX  -SCDs    #Dispo  -full code  -medicine for management of anemia 2/2 GIB/gastric CA  -discussed with daughter Xochitl, son Virgil 71 yo M, ex-smoker, with recent hx of GIB and ex laparotomy for perforated gastric ulcer that turned out to be gastric cancer and also possible complication of surgery with peritonitis with 2 hospitalizations in [place] in the month of April, presenting with melena/GIB. Likely 2/2 metastatic GI cancer.    #Gastric cancer, metastatic  #Carcinomatosis  #GIB  #Anemia  -likely metastatic ca; CEA high, per records, pt has oncologist and surgeon but after discussing with family (daughter Xochitl and son Virgil) yesterday, they want to switch to [facility] providers. Heme/onc and surgery consulted  -per surgery no immediate intervention - will consult IR for bx if unable to obtain outside records, d/w CELESTINO Tolbert  -per heme/onc may be chemo candidate  -also would benefit from palliative consult  -for now c/w protonix, serial CBCs, transfuse for Hgb <7  -c/w fluids    #Prediabetes  -A1C 5.9%; will likely not require HISS    #DVT PPX  -SCDs    #Dispo  -full code  -medicine for management of anemia 2/2 GIB/gastric CA  -discussed with daughter Xochitl, abundio Herman

## 2021-05-31 NOTE — PROGRESS NOTE ADULT - ASSESSMENT
69 y/o with surgical explore for gastric perforation and was found to have locally advanced gastric cancer in 4/2021    gastric cancer   perforated in 4/2021  s/p surgical intervention at [facility]   tissue diagnosis brought by pt's daughter today   poorly differentiated adenocarcinoma with negative Her-2  CT showed metastatic disease with peritoneal carcinomatosis   need to start palliative chemo+immunotherapy ASAP to avoid bowel obstruction   d/w pt and family   need GI clearance for d/c  will f/u in My office on Thursday for preparation of chemotherapy   All d/w carried on with pt and family in  [place]  anemia due to GI bleeding   parental iron now and continue outpatient

## 2021-05-31 NOTE — PROGRESS NOTE ADULT - SUBJECTIVE AND OBJECTIVE BOX
Janet Magallon MD  Division of Hospital Medicine    24 HOUR EVENTS/ROS: Overnight reports feeling bloated but minimal pain. Has not had BM. Denies F/C, CP/HA.    MEDICATIONS:  acetaminophen   Tablet .. 650 milliGRAM(s) Oral every 4 hours PRN  melatonin 5 milliGRAM(s) Oral at bedtime  ondansetron Injectable 4 milliGRAM(s) IV Push every 6 hours PRN  oxyCODONE    IR 5 milliGRAM(s) Oral every 8 hours PRN  traMADol 50 milliGRAM(s) Oral every 6 hours PRN  calcium carbonate    500 mG (Tums) Chewable 1 Tablet(s) Chew three times a day PRN  pantoprazole  Injectable 40 milliGRAM(s) IV Push two times a day  senna 2 Tablet(s) Oral at bedtime  simethicone 80 milliGRAM(s) Chew two times a day  sucralfate 1 Gram(s) Oral four times a day  sodium chloride 0.9%. 1000 milliLiter(s) IV Continuous <Continuous>    PHYSICAL EXAM:  T(C): 36.7 (05-31-21 @ 04:56), Max: 36.9 (05-30-21 @ 13:56)  HR: 76 (05-31-21 @ 04:56) (73 - 76)  BP: 112/67 (05-31-21 @ 04:56) (110/62 - 115/64)  RR: 16 (05-31-21 @ 04:56) (16 - 18)  SpO2: 98% (05-31-21 @ 04:56) (98% - 99%)  Wt(kg): --  Daily     Daily   I&O's Summary    Appearance: NAD thin  male sitting up in bed, pale	  HEENT:   Normal oral mucosa, PERRL, EOMI	  Lymphatic: No lymphadenopathy  Cardiovascular: Normal S1 S2, No JVD, No murmurs, No edema  Respiratory: Lungs clear to auscultation	  Neuro/psych: Grossly non-focal, CN 2-12 intact, AAOX3, mood and affect normal  Gastrointestinal:  Soft, Non-tender, + BS	  Skin: No rashes, No ecchymoses, No cyanosis	  Extremities: Normal range of motion, No clubbing, cyanosis or edema  Vascular: Peripheral pulses palpable 2+ bilaterally      LABS:	   personally reviewed, anemic, hypokalemic	                        10.4   7.99  )-----------( 307      ( 31 May 2021 07:40 )             32.7     05-31    140  |  105  |  12  ----------------------------<  100<H>  3.4<L>   |  23  |  0.50  05-30    143  |  109<H>  |  14  ----------------------------<  80  3.5   |  25  |  0.46<L>    Ca    8.3<L>      31 May 2021 07:40  Ca    8.4      30 May 2021 08:26  Phos  2.9     05-30  Mg     2.0     05-30    TPro  7.3  /  Alb  2.6<L>  /  TBili  0.2  /  DBili  x   /  AST  45<H>  /  ALT  29  /  AlkPhos  184<H>  05-29    POCT Blood Glucose.: 120 mg/dL (31 May 2021 12:02)  POCT Blood Glucose.: 79 mg/dL (31 May 2021 05:53)  POCT Blood Glucose.: 82 mg/dL (30 May 2021 23:47)    BCX/UCX:   Coags: PT/INR - ( 29 May 2021 20:24 )   PT: 13.1 sec;   INR: 1.11 ratio         PTT - ( 29 May 2021 20:24 )  PTT:33.4 sec    CARDIAC MARKERS:  Troponin I, Serum: <0.015 ng/mL (05-30 @ 08:26)    Consult notes reviewed:  heme/onc, surgery Janet Magallon MD  Division of Hospital Medicine    24 HOUR EVENTS/ROS: Overnight reports feeling bloated but minimal pain. Has not had BM today. Denies F/C, CP/HA.    MEDICATIONS:  acetaminophen   Tablet .. 650 milliGRAM(s) Oral every 4 hours PRN  melatonin 5 milliGRAM(s) Oral at bedtime  ondansetron Injectable 4 milliGRAM(s) IV Push every 6 hours PRN  oxyCODONE    IR 5 milliGRAM(s) Oral every 8 hours PRN  traMADol 50 milliGRAM(s) Oral every 6 hours PRN  calcium carbonate    500 mG (Tums) Chewable 1 Tablet(s) Chew three times a day PRN  pantoprazole  Injectable 40 milliGRAM(s) IV Push two times a day  senna 2 Tablet(s) Oral at bedtime  simethicone 80 milliGRAM(s) Chew two times a day  sucralfate 1 Gram(s) Oral four times a day  sodium chloride 0.9%. 1000 milliLiter(s) IV Continuous <Continuous>    PHYSICAL EXAM:  T(C): 36.7 (05-31-21 @ 04:56), Max: 36.9 (05-30-21 @ 13:56)  HR: 76 (05-31-21 @ 04:56) (73 - 76)  BP: 112/67 (05-31-21 @ 04:56) (110/62 - 115/64)  RR: 16 (05-31-21 @ 04:56) (16 - 18)  SpO2: 98% (05-31-21 @ 04:56) (98% - 99%)  Wt(kg): --  Daily     Daily   I&O's Summary    Appearance: NAD thin  male sitting up in bed, pale	  HEENT:   Normal oral mucosa, PERRL, EOMI	  Lymphatic: No lymphadenopathy  Cardiovascular: Normal S1 S2, No JVD, No murmurs, No edema  Respiratory: Lungs clear to auscultation	  Neuro/psych: Grossly non-focal, CN 2-12 intact, AAOX3, mood and affect normal  Gastrointestinal:  Soft, Non-tender, + BS	  Skin: No rashes, No ecchymoses, No cyanosis	  Extremities: Normal range of motion, No clubbing, cyanosis or edema  Vascular: Peripheral pulses palpable 2+ bilaterally      LABS:	   personally reviewed, anemic, hypokalemic	                        10.4   7.99  )-----------( 307      ( 31 May 2021 07:40 )             32.7     05-31    140  |  105  |  12  ----------------------------<  100<H>  3.4<L>   |  23  |  0.50  05-30    143  |  109<H>  |  14  ----------------------------<  80  3.5   |  25  |  0.46<L>    Ca    8.3<L>      31 May 2021 07:40  Ca    8.4      30 May 2021 08:26  Phos  2.9     05-30  Mg     2.0     05-30    TPro  7.3  /  Alb  2.6<L>  /  TBili  0.2  /  DBili  x   /  AST  45<H>  /  ALT  29  /  AlkPhos  184<H>  05-29    POCT Blood Glucose.: 120 mg/dL (31 May 2021 12:02)  POCT Blood Glucose.: 79 mg/dL (31 May 2021 05:53)  POCT Blood Glucose.: 82 mg/dL (30 May 2021 23:47)    BCX/UCX:   Coags: PT/INR - ( 29 May 2021 20:24 )   PT: 13.1 sec;   INR: 1.11 ratio         PTT - ( 29 May 2021 20:24 )  PTT:33.4 sec    CARDIAC MARKERS:  Troponin I, Serum: <0.015 ng/mL (05-30 @ 08:26)    Consult notes reviewed:  heme/onc, surgery

## 2021-06-01 DIAGNOSIS — Z02.9 ENCOUNTER FOR ADMINISTRATIVE EXAMINATIONS, UNSPECIFIED: ICD-10-CM

## 2021-06-01 DIAGNOSIS — Z71.89 OTHER SPECIFIED COUNSELING: ICD-10-CM

## 2021-06-01 LAB
ALBUMIN SERPL ELPH-MCNC: 2.4 G/DL — LOW (ref 3.5–5)
ALP SERPL-CCNC: 205 U/L — HIGH (ref 40–120)
ALT FLD-CCNC: 23 U/L DA — SIGNIFICANT CHANGE UP (ref 10–60)
ANION GAP SERPL CALC-SCNC: 8 MMOL/L — SIGNIFICANT CHANGE UP (ref 5–17)
AST SERPL-CCNC: 45 U/L — HIGH (ref 10–40)
BASOPHILS # BLD AUTO: 0.05 K/UL — SIGNIFICANT CHANGE UP (ref 0–0.2)
BASOPHILS NFR BLD AUTO: 0.6 % — SIGNIFICANT CHANGE UP (ref 0–2)
BILIRUB SERPL-MCNC: 0.4 MG/DL — SIGNIFICANT CHANGE UP (ref 0.2–1.2)
BUN SERPL-MCNC: 9 MG/DL — SIGNIFICANT CHANGE UP (ref 7–18)
CALCIUM SERPL-MCNC: 8.7 MG/DL — SIGNIFICANT CHANGE UP (ref 8.4–10.5)
CHLORIDE SERPL-SCNC: 104 MMOL/L — SIGNIFICANT CHANGE UP (ref 96–108)
CO2 SERPL-SCNC: 26 MMOL/L — SIGNIFICANT CHANGE UP (ref 22–31)
CREAT SERPL-MCNC: 0.42 MG/DL — LOW (ref 0.5–1.3)
EOSINOPHIL # BLD AUTO: 0.1 K/UL — SIGNIFICANT CHANGE UP (ref 0–0.5)
EOSINOPHIL NFR BLD AUTO: 1.2 % — SIGNIFICANT CHANGE UP (ref 0–6)
GLUCOSE BLDC GLUCOMTR-MCNC: 100 MG/DL — HIGH (ref 70–99)
GLUCOSE BLDC GLUCOMTR-MCNC: 135 MG/DL — HIGH (ref 70–99)
GLUCOSE BLDC GLUCOMTR-MCNC: 74 MG/DL — SIGNIFICANT CHANGE UP (ref 70–99)
GLUCOSE BLDC GLUCOMTR-MCNC: 76 MG/DL — SIGNIFICANT CHANGE UP (ref 70–99)
GLUCOSE BLDC GLUCOMTR-MCNC: 81 MG/DL — SIGNIFICANT CHANGE UP (ref 70–99)
GLUCOSE BLDC GLUCOMTR-MCNC: 93 MG/DL — SIGNIFICANT CHANGE UP (ref 70–99)
GLUCOSE SERPL-MCNC: 85 MG/DL — SIGNIFICANT CHANGE UP (ref 70–99)
HCT VFR BLD CALC: 33.5 % — LOW (ref 39–50)
HGB BLD-MCNC: 10.8 G/DL — LOW (ref 13–17)
IMM GRANULOCYTES NFR BLD AUTO: 0.6 % — SIGNIFICANT CHANGE UP (ref 0–1.5)
LYMPHOCYTES # BLD AUTO: 1.85 K/UL — SIGNIFICANT CHANGE UP (ref 1–3.3)
LYMPHOCYTES # BLD AUTO: 22.7 % — SIGNIFICANT CHANGE UP (ref 13–44)
MAGNESIUM SERPL-MCNC: 2 MG/DL — SIGNIFICANT CHANGE UP (ref 1.6–2.6)
MCHC RBC-ENTMCNC: 28.1 PG — SIGNIFICANT CHANGE UP (ref 27–34)
MCHC RBC-ENTMCNC: 32.2 GM/DL — SIGNIFICANT CHANGE UP (ref 32–36)
MCV RBC AUTO: 87.2 FL — SIGNIFICANT CHANGE UP (ref 80–100)
MONOCYTES # BLD AUTO: 0.55 K/UL — SIGNIFICANT CHANGE UP (ref 0–0.9)
MONOCYTES NFR BLD AUTO: 6.7 % — SIGNIFICANT CHANGE UP (ref 2–14)
NEUTROPHILS # BLD AUTO: 5.55 K/UL — SIGNIFICANT CHANGE UP (ref 1.8–7.4)
NEUTROPHILS NFR BLD AUTO: 68.2 % — SIGNIFICANT CHANGE UP (ref 43–77)
NRBC # BLD: 0 /100 WBCS — SIGNIFICANT CHANGE UP (ref 0–0)
PLATELET # BLD AUTO: 303 K/UL — SIGNIFICANT CHANGE UP (ref 150–400)
POTASSIUM SERPL-MCNC: 3.5 MMOL/L — SIGNIFICANT CHANGE UP (ref 3.5–5.3)
POTASSIUM SERPL-SCNC: 3.5 MMOL/L — SIGNIFICANT CHANGE UP (ref 3.5–5.3)
PROT SERPL-MCNC: 6.8 G/DL — SIGNIFICANT CHANGE UP (ref 6–8.3)
RBC # BLD: 3.84 M/UL — LOW (ref 4.2–5.8)
RBC # FLD: 13.6 % — SIGNIFICANT CHANGE UP (ref 10.3–14.5)
SODIUM SERPL-SCNC: 138 MMOL/L — SIGNIFICANT CHANGE UP (ref 135–145)
WBC # BLD: 8.15 K/UL — SIGNIFICANT CHANGE UP (ref 3.8–10.5)
WBC # FLD AUTO: 8.15 K/UL — SIGNIFICANT CHANGE UP (ref 3.8–10.5)

## 2021-06-01 PROCEDURE — 71260 CT THORAX DX C+: CPT | Mod: 26

## 2021-06-01 PROCEDURE — 70450 CT HEAD/BRAIN W/O DYE: CPT | Mod: 26

## 2021-06-01 PROCEDURE — 99232 SBSQ HOSP IP/OBS MODERATE 35: CPT

## 2021-06-01 RX ORDER — DEXTROSE 50 % IN WATER 50 %
15 SYRINGE (ML) INTRAVENOUS ONCE
Refills: 0 | Status: COMPLETED | OUTPATIENT
Start: 2021-06-01 | End: 2021-06-01

## 2021-06-01 RX ORDER — IRON SUCROSE 20 MG/ML
100 INJECTION, SOLUTION INTRAVENOUS ONCE
Refills: 0 | Status: COMPLETED | OUTPATIENT
Start: 2021-06-01 | End: 2021-06-01

## 2021-06-01 RX ORDER — MULTIVIT WITH MIN/MFOLATE/K2 340-15/3 G
1 POWDER (GRAM) ORAL ONCE
Refills: 0 | Status: COMPLETED | OUTPATIENT
Start: 2021-06-01 | End: 2021-06-01

## 2021-06-01 RX ADMIN — OXYCODONE HYDROCHLORIDE 5 MILLIGRAM(S): 5 TABLET ORAL at 01:09

## 2021-06-01 RX ADMIN — Medication 5 MILLIGRAM(S): at 22:19

## 2021-06-01 RX ADMIN — OXYCODONE HYDROCHLORIDE 5 MILLIGRAM(S): 5 TABLET ORAL at 17:16

## 2021-06-01 RX ADMIN — Medication 1 BOTTLE: at 19:11

## 2021-06-01 RX ADMIN — IRON SUCROSE 210 MILLIGRAM(S): 20 INJECTION, SOLUTION INTRAVENOUS at 17:13

## 2021-06-01 RX ADMIN — PANTOPRAZOLE SODIUM 40 MILLIGRAM(S): 20 TABLET, DELAYED RELEASE ORAL at 19:11

## 2021-06-01 RX ADMIN — POLYETHYLENE GLYCOL 3350 17 GRAM(S): 17 POWDER, FOR SOLUTION ORAL at 19:11

## 2021-06-01 RX ADMIN — Medication 1 GRAM(S): at 19:12

## 2021-06-01 RX ADMIN — OXYCODONE HYDROCHLORIDE 5 MILLIGRAM(S): 5 TABLET ORAL at 01:12

## 2021-06-01 RX ADMIN — Medication 1 GRAM(S): at 06:04

## 2021-06-01 RX ADMIN — SIMETHICONE 80 MILLIGRAM(S): 80 TABLET, CHEWABLE ORAL at 06:05

## 2021-06-01 RX ADMIN — PANTOPRAZOLE SODIUM 40 MILLIGRAM(S): 20 TABLET, DELAYED RELEASE ORAL at 06:05

## 2021-06-01 RX ADMIN — Medication 1 GRAM(S): at 00:51

## 2021-06-01 RX ADMIN — SENNA PLUS 2 TABLET(S): 8.6 TABLET ORAL at 22:19

## 2021-06-01 RX ADMIN — OXYCODONE HYDROCHLORIDE 5 MILLIGRAM(S): 5 TABLET ORAL at 14:23

## 2021-06-01 RX ADMIN — SIMETHICONE 80 MILLIGRAM(S): 80 TABLET, CHEWABLE ORAL at 19:12

## 2021-06-01 NOTE — PROGRESS NOTE ADULT - SUBJECTIVE AND OBJECTIVE BOX
NP Note discussed with  Primary Attending    Patient is a 70y old  Male who presents with a chief complaint of Dark stool (01 Jun 2021 16:06)      INTERVAL HPI/OVERNIGHT EVENTS: Seen at bedside. mild abdominal pain slightly improving with medication. no BM today.     MEDICATIONS  (STANDING):  iron sucrose IVPB 100 milliGRAM(s) IV Intermittent once  magnesium citrate Oral Solution 1 Bottle Oral once  melatonin 5 milliGRAM(s) Oral at bedtime  pantoprazole  Injectable 40 milliGRAM(s) IV Push two times a day  polyethylene glycol 3350 17 Gram(s) Oral daily  senna 2 Tablet(s) Oral at bedtime  simethicone 80 milliGRAM(s) Chew two times a day  sodium chloride 0.9%. 1000 milliLiter(s) (70 mL/Hr) IV Continuous <Continuous>  sucralfate suspension 1 Gram(s) Oral four times a day    MEDICATIONS  (PRN):  acetaminophen   Tablet .. 650 milliGRAM(s) Oral every 4 hours PRN Mild Pain (1 - 3)  calcium carbonate    500 mG (Tums) Chewable 1 Tablet(s) Chew three times a day PRN Heartburn  ondansetron Injectable 4 milliGRAM(s) IV Push every 6 hours PRN Nausea and/or Vomiting  oxyCODONE    IR 5 milliGRAM(s) Oral every 8 hours PRN Severe Pain (7 - 10)  traMADol 50 milliGRAM(s) Oral every 6 hours PRN Moderate Pain (4 - 6)      __________________________________________________  REVIEW OF SYSTEMS:    CONSTITUTIONAL: No fever,   EYES: no acute visual disturbances  NECK: No pain or stiffness  RESPIRATORY: No cough; No shortness of breath  CARDIOVASCULAR: No chest pain, no palpitations  GASTROINTESTINAL: abdominal pain 4/10. No nausea or vomiting; No diarrhea   NEUROLOGICAL: No headache or numbness, no tremors  MUSCULOSKELETAL: No joint pain, no muscle pain  GENITOURINARY: no dysuria, no frequency, no hesitancy  PSYCHIATRY: no depression , no anxiety  ALL OTHER  ROS negative        Vital Signs Last 24 Hrs  T(C): 36.6 (01 Jun 2021 13:40), Max: 36.8 (31 May 2021 20:34)  T(F): 97.9 (01 Jun 2021 13:40), Max: 98.2 (31 May 2021 20:34)  HR: 74 (01 Jun 2021 13:40) (72 - 74)  BP: 109/69 (01 Jun 2021 13:40) (108/73 - 118/70)  BP(mean): --  RR: 15 (01 Jun 2021 13:40) (15 - 17)  SpO2: 98% (01 Jun 2021 13:40) (98% - 99%)    ________________________________________________  PHYSICAL EXAM:  GENERAL: NAD. speaks in Vincentian  HEENT: Normocephalic;  conjunctivae and sclerae clear; moist mucous membranes;   NECK : supple  CHEST/LUNG: Clear to auscultation bilaterally with good air entry   HEART: S1 S2  regular; no murmurs, gallops or rubs  ABDOMEN: Soft, Nontender, Nondistended; Bowel sounds present  EXTREMITIES: no cyanosis; no edema; no calf tenderness  SKIN: warm and dry; no rash  NERVOUS SYSTEM:  Awake and alert; Oriented  to place, person and time ; no new deficits    _________________________________________________  LABS:                        10.8   8.15  )-----------( 303      ( 01 Jun 2021 06:58 )             33.5     06-01    138  |  104  |  9   ----------------------------<  85  3.5   |  26  |  0.42<L>    Ca    8.7      01 Jun 2021 06:58  Mg     2.0     06-01    TPro  6.8  /  Alb  2.4<L>  /  TBili  0.4  /  DBili  x   /  AST  45<H>  /  ALT  23  /  AlkPhos  205<H>  06-01        CAPILLARY BLOOD GLUCOSE      POCT Blood Glucose.: 76 mg/dL (01 Jun 2021 06:07)  POCT Blood Glucose.: 100 mg/dL (01 Jun 2021 00:08)  POCT Blood Glucose.: 112 mg/dL (31 May 2021 16:53)        RADIOLOGY & ADDITIONAL TESTS:    Imaging  Reviewed:  YES    < from: CT Angio Abdomen and Pelvis w/ IV Cont (05.29.21 @ 19:06) >    EXAM:  CT ANGIO ABD PELV (W)AW IC                            PROCEDURE DATE:  05/29/2021          INTERPRETATION:  CLINICAL INFORMATION: Melanoma, lower abdominal pain, GI bleed, laparotomy with perforated ulcer versus gastric cancer. Peritendinitis    COMPARISON: None.    CONTRAST/COMPLICATIONS:  IV Contrast: Omnipaque 350  90 cc administered   10 cc discarded  Oral Contrast: NONE  Complications: None reported at time of study completion    PROCEDURE:  CT of the Abdomen and Pelvis was performed.  Precontrast, Arterial and Delayed phases were performed.  Sagittal and coronal reformats were performed.    FINDINGS:  LOWER CHEST: RIGHT lower lobe atelectasis. RIGHT pleural effusion.    LIVER: Multiple hepatic cysts.  BILE DUCTS: Normal caliber.  GALLBLADDER: Within normal limits.  SPLEEN: Within normal limits.  PANCREAS: Within normal limits.  ADRENALS: Within normal limits.  KIDNEYS/URETERS: Within normal limits.    BLADDER: Within normal limits.  REPRODUCTIVE ORGANS: Enlarged prostate.    BOWEL: Markedly abnormal stomach with large anterior gastric wall mass with ulceration. The mass measures 6.7 x 3.3 x 4.7 cm. Evaluation is somewhat limited by extensive respiratory motion artifact. No evidence of active extravasation of contrast material to suggest acute GI bleed.  There are multiple nodules within the gastrocolic ligament/lesser omentum consistent with carcinomatosis. For example there is a 10 mm nodule seen on the RIGHT (11-63). Additional omental nodule measures 1.6 x 1.2 cm, also to the RIGHT of midline (11-57). Probable peritoneal implants along the RIGHT paracolic gutter. Large peripancreatic lymph node versus peritoneal implant between the duodenal bulb and head of the pancreas (11-44). Malignant portacaval lymphadenopathy.    No evidence of bowel obstruction. Appendix is not visualized. No evidence of inflammation in the pericecal region.  PERITONEUM: Multiple peritoneal implants as described above. Peritoneal carcinomatosis.  VESSELS: Within normal limits.  RETROPERITONEUM/LYMPH NODES: LEFT para-aortic lymphadenopathy at the level of the LEFT renal vein.  ABDOMINAL WALL: Within normal limits.  BONES: Within normal limits.    IMPRESSION:  1.  Large malignant gastric ulcer within a large gastric mass on the anterior wall of the body of the stomach measuring 6.7 x 3.3 x 4.7 cm.  2.  No evidence of active extravasation of contrast material to suggest acute GI bleed.  3.  Omental carcinomatosis and peritoneal carcinomatosis as described above.  4.  Retroperitoneal LEFT periaortic lymphadenopathy.            MARIA LUISA SWEENEY MD; Attending Radiologist  This document has been electronically signed. May 29 2021  7:43PM    < end of copied text >    Consultant(s) Notes Reviewed:   YES      Plan of care was discussed with patient and /or primary care giver; all questions and concerns were addressed

## 2021-06-01 NOTE — DIETITIAN INITIAL EVALUATION ADULT. - PERTINENT MEDS FT
MEDICATIONS:  acetaminophen   Tablet .. 650 every 4 hours PRN  calcium carbonate    500 mG (Tums) Chewable 1 three times a day PRN  iron sucrose IVPB 100 once  magnesium citrate Oral Solution 1 once  melatonin 5 at bedtime  ondansetron Injectable 4 every 6 hours PRN  oxyCODONE    IR 5 every 8 hours PRN  pantoprazole  Injectable 40 two times a day  polyethylene glycol 3350 17 daily  senna 2 at bedtime  simethicone 80 two times a day  sodium chloride 0.9%. 1000 <Continuous>  sucralfate suspension 1 four times a day  traMADol 50 every 6 hours PRN

## 2021-06-01 NOTE — DIETITIAN INITIAL EVALUATION ADULT. - PROBLEM SELECTOR PLAN 2
Pt has been following up with oncologist and surgeon in the [place] area and is scheduled for PET scans in mid-June.   CT showed Large malignant gastric ulcer within a large gastric mass on the anterior wall of the body of the stomach measuring 6.7 x 3.3 x 4.7 cm. Omental carcinomatosis and peritoneal carcinomatosis. Retroperitoneal LEFT periaortic lymphadenopathy.  will send CEA, ca 19-9  will monitor for now and pt can fu with heme/onc as outpt

## 2021-06-01 NOTE — PROGRESS NOTE ADULT - PROBLEM SELECTOR PLAN 4
Pt pw alk phos of 184, mildly elevated ast   CT showed multiple hepatic cysts   hepatitis panel negative  monitor CMP

## 2021-06-01 NOTE — PROGRESS NOTE ADULT - PROBLEM SELECTOR PLAN 1
Pt with recent hx of GIB in april s/p ex laparotomy for perforated gastric ulcer that turned out to be gastric cancer  pw dark stools  Hgb 10 ---> 10.8 ; unknown baseline   CT angio showed no active bleeding   goal hgb > 7; will hold off any transfusion for now  s/p PPI 80 mg x1 ; will cw 40 iv bid   c/w  zofran prn for nausea   advance diet as tolerated  monitor CBC  surgery following no acute intervention this time.   Heme/onc [facility] following

## 2021-06-01 NOTE — DIETITIAN INITIAL EVALUATION ADULT. - PROBLEM SELECTOR PLAN 1
Pt with recent hx of GIB in april s/p ex laparotomy for perforated gastric ulcer that turned out to be gastric cancer  pw dark stools  Hgb 10->9.2; unknown baseline   CT angio showed no active bleeding   will send anemia panel   goal hgb > 7; will hold off any transfusion for now  s/p PPI 80 mg x1 ; will cw 40 iv bid   will cw zofran prn for nausea   will keep NPo with iv hydration   will monitor cbc q8h

## 2021-06-01 NOTE — DIETITIAN INITIAL EVALUATION ADULT. - OTHER INFO
Pt visited. Pt is chinese ( sneha )  speaking . Wife ate bedside.  Wife at bedside. Wife called Daughter ( Xochitl ) And spoke with Daughter. per Daughter Pt  can only drink one ounce of Liquid at the time, Pt gets full easily and Gassy . +  Significant weight loss  . PT daughter Requesting for IV Nutrition. D/W  heme/ONC .NKFA.  Offered TWO HILDA HN  and Agreed to try. Per  Daughter  lb Current bed scale WIth out SCD device 107 LB, NFPE  Performed. Loss Of muscle mass + Temporal and Clavicles Noted

## 2021-06-01 NOTE — DIETITIAN INITIAL EVALUATION ADULT. - HEIGHT FOR BMI (FEET)
5 [Follow-Up - Clinic] : a clinic follow-up of [FreeTextEntry2] : f/u  For: HTN, HLD, CAD s/p CABg x4, Angina, recent PCI [FreeTextEntry1] : f/u  For: HTN, HLD, CAD s/p CABg x4, Angina, recent PCI

## 2021-06-01 NOTE — PROGRESS NOTE ADULT - SUBJECTIVE AND OBJECTIVE BOX
Janet Magallon MD  Division of Hospital Medicine  24 HOUR EVENTS/ROS: Pt reporting he is hungry. Has not had BM today. Denies N/V, abdominal pain, HA, F/C.    MEDICATIONS:  acetaminophen   Tablet .. 650 milliGRAM(s) Oral every 4 hours PRN  melatonin 5 milliGRAM(s) Oral at bedtime  ondansetron Injectable 4 milliGRAM(s) IV Push every 6 hours PRN  oxyCODONE    IR 5 milliGRAM(s) Oral every 8 hours PRN  traMADol 50 milliGRAM(s) Oral every 6 hours PRN  calcium carbonate    500 mG (Tums) Chewable 1 Tablet(s) Chew three times a day PRN  magnesium citrate Oral Solution 1 Bottle Oral once  pantoprazole  Injectable 40 milliGRAM(s) IV Push two times a day  polyethylene glycol 3350 17 Gram(s) Oral daily  senna 2 Tablet(s) Oral at bedtime  simethicone 80 milliGRAM(s) Chew two times a day  sucralfate suspension 1 Gram(s) Oral four times a day  iron sucrose IVPB 100 milliGRAM(s) IV Intermittent once  sodium chloride 0.9%. 1000 milliLiter(s) IV Continuous <Continuous>      PHYSICAL EXAM:  T(C): 36.6 (06-01-21 @ 13:40), Max: 36.8 (05-31-21 @ 20:34)  HR: 74 (06-01-21 @ 13:40) (72 - 74)  BP: 109/69 (06-01-21 @ 13:40) (108/73 - 118/70)  RR: 15 (06-01-21 @ 13:40) (15 - 17)  SpO2: 98% (06-01-21 @ 13:40) (98% - 99%)  Wt(kg): --  Daily     Daily   I&O's Summary      Appearance: thin  male sitting up on bed NAD	  HEENT:   Normal oral mucosa, PERRL, EOMI, pale sclerae	  Lymphatic: No lymphadenopathy  Cardiovascular: Normal S1 S2, No JVD, No murmurs, No edema  Respiratory: Lungs clear to auscultation	  Neuro/psych: Grossly non-focal, CN 2-12 intact, AAOX3, mood and affect normal  Gastrointestinal:  Soft, Non-tender, + BS	well healed vertical scar supraumbilical  Skin: No rashes, No ecchymoses, No cyanosis	  Extremities: Normal range of motion, No clubbing, cyanosis or edema  Vascular: Peripheral pulses palpable 2+ bilaterally    LABS:	  reviewed personally - anemic 	                        10.8   8.15  )-----------( 303      ( 01 Jun 2021 06:58 )             33.5     06-01    138  |  104  |  9   ----------------------------<  85  3.5   |  26  |  0.42<L>  05-31    140  |  105  |  12  ----------------------------<  100<H>  3.4<L>   |  23  |  0.50    Ca    8.7      01 Jun 2021 06:58  Ca    8.3<L>      31 May 2021 07:40  Mg     2.0     06-01    TPro  6.8  /  Alb  2.4<L>  /  TBili  0.4  /  DBili  x   /  AST  45<H>  /  ALT  23  /  AlkPhos  205<H>  06-01    POCT Blood Glucose.: 76 mg/dL (01 Jun 2021 06:07)  POCT Blood Glucose.: 100 mg/dL (01 Jun 2021 00:08)  POCT Blood Glucose.: 112 mg/dL (31 May 2021 16:53)    CARDIAC MARKERS:  Troponin I, Serum: <0.015 ng/mL (05-30 @ 08:26)    Consult notes reviewed: heme/onc

## 2021-06-01 NOTE — PROGRESS NOTE ADULT - ATTENDING COMMENTS
pt seen and examined with PA  d/w attending on case   will try to d/c pt home if he tolerate po intake and no evidence of active GI bleeding   plan to start palliative chemotherapy at outpatient setting ASAP

## 2021-06-01 NOTE — PROGRESS NOTE ADULT - ASSESSMENT
70M with poorly differentiated gastric adenocarcinoma, negative Her-2 with mets and peritoneal carcinomatosis  labs reviewed, H/H stable, afvss, no signs/symptoms of obstruction or bleeding currently    - Continue care as per heme/onc team  - No acute surgical intervention indicated at present time

## 2021-06-01 NOTE — PROGRESS NOTE ADULT - SUBJECTIVE AND OBJECTIVE BOX
INTERVAL HPI/OVERNIGHT EVENTS:  No acute events overnight. Pt resting comfortably. No nausea/vomiting. C/o upper abdominal pain.     MEDICATIONS  (STANDING):  melatonin 5 milliGRAM(s) Oral at bedtime  pantoprazole  Injectable 40 milliGRAM(s) IV Push two times a day  polyethylene glycol 3350 17 Gram(s) Oral daily  senna 2 Tablet(s) Oral at bedtime  simethicone 80 milliGRAM(s) Chew two times a day  sodium chloride 0.9%. 1000 milliLiter(s) (70 mL/Hr) IV Continuous <Continuous>  sucralfate suspension 1 Gram(s) Oral four times a day    MEDICATIONS  (PRN):  acetaminophen   Tablet .. 650 milliGRAM(s) Oral every 4 hours PRN Mild Pain (1 - 3)  calcium carbonate    500 mG (Tums) Chewable 1 Tablet(s) Chew three times a day PRN Heartburn  ondansetron Injectable 4 milliGRAM(s) IV Push every 6 hours PRN Nausea and/or Vomiting  oxyCODONE    IR 5 milliGRAM(s) Oral every 8 hours PRN Severe Pain (7 - 10)  traMADol 50 milliGRAM(s) Oral every 6 hours PRN Moderate Pain (4 - 6)      Vital Signs Last 24 Hrs  T(C): 36.6 (01 Jun 2021 05:25), Max: 36.8 (31 May 2021 20:34)  T(F): 97.8 (01 Jun 2021 05:25), Max: 98.2 (31 May 2021 20:34)  HR: 74 (01 Jun 2021 05:25) (72 - 74)  BP: 118/70 (01 Jun 2021 05:25) (108/73 - 118/70)  RR: 16 (01 Jun 2021 05:25) (16 - 17)  SpO2: 99% (01 Jun 2021 05:25) (98% - 99%)    Physical:  General: Alert and oriented, not in acute distress  Respiratory: Breathing unlabored  Abdomen: Soft, nondistended, nontender; upper midline scar present      LABS:                        10.8   8.15  )-----------( 303      ( 01 Jun 2021 06:58 )             33.5             06-01    138  |  104  |  9   ----------------------------<  85  3.5   |  26  |  x     Ca    8.7      01 Jun 2021 06:58  Phos  2.9     05-30  Mg     2.0     06-01    TPro  x   /  Alb  2.4<L>  /  TBili  x   /  DBili  x   /  AST  x   /  ALT  x   /  AlkPhos  x   06-01

## 2021-06-01 NOTE — PROGRESS NOTE ADULT - PROBLEM SELECTOR PLAN 2
Pt has been following up with oncologist and surgeon in the [place] area and is scheduled for PET scans in mid-June.   CT showed Large malignant gastric ulcer within a large gastric mass on the anterior wall of the body of the stomach measuring 6.7 x 3.3 x 4.7 cm. Omental carcinomatosis and peritoneal carcinomatosis. Retroperitoneal LEFT periaortic lymphadenopathy.  f/u CEA, ca 19-9  will monitor for now and pt can fu with heme/onc as outpt : 6/3, Thursday with dr. Christopher Pt has been following up with oncologist and surgeon in the [place] area and is scheduled for PET scans in mid-June.   CT showed Large malignant gastric ulcer within a large gastric mass on the anterior wall of the body of the stomach measuring 6.7 x 3.3 x 4.7 cm. Omental carcinomatosis and peritoneal carcinomatosis. Retroperitoneal LEFT periaortic lymphadenopathy.  f/u CEA, ca 19-9  bowel regimen with Mag citrate, miralax  c/w simethicone  pain mangement  will monitor for now and pt can fu with heme/onc as outpt : 6/3, Thursday with dr. Christopher

## 2021-06-01 NOTE — PROGRESS NOTE ADULT - ASSESSMENT
complete note to follow   Assessment and Plan:   · Assessment	  71 y/o with surgical explore for gastric perforation and was found to have locally advanced gastric cancer in 4/2021    # Gastric cancer   perforated in 4/2021  s/p surgical intervention at [facility]   tissue diagnosis brought by pt's daughter- poorly differentiated adenocarcinoma with negative Her-2  CT showed metastatic disease with peritoneal carcinomatosis   need to start palliative chemo+immunotherapy ASAP to avoid bowel obstruction   d/w pt and family   need GI clearance for d/c  will f/u in My office on Thursday for preparation of chemotherapy   All d/w carried on with pt and family in  [place]  anemia due to GI bleeding   CT A/P: Large malignant gastric ulcer within a large gastric mass on the anterior wall of the body of the stomach measuring 6.7 x 3.3 x 4.7 cm. No evidence of active extravasation of contrast material to suggest acute GI bleed.Omental carcinomatosis and peritoneal carcinomatosis as described above.Retroperitoneal LEFT periaortic lymphadenopathy.  CEA=9,095, CA 19-9 nl  Rec's:  -Pain mgmt  -parental iron now and continue outpatient    -f/u chest CT  -f/u with Oncologist Dr. Christopher on discharge    Thank you for the referral. Will continue to monitor the patient.  Please call with any questions [phone]  Above reviewed with Attending Dr. Christopher

## 2021-06-01 NOTE — PROGRESS NOTE ADULT - SUBJECTIVE AND OBJECTIVE BOX
Patient is a 70y old  Male who presents with a chief complaint of Dark stool (01 Jun 2021 07:37)      SUBJECTIVE / OVERNIGHT EVENTS:    ADDITIONAL REVIEW OF SYSTEMS:    MEDICATIONS  (STANDING):  magnesium citrate Oral Solution 1 Bottle Oral once  melatonin 5 milliGRAM(s) Oral at bedtime  pantoprazole  Injectable 40 milliGRAM(s) IV Push two times a day  polyethylene glycol 3350 17 Gram(s) Oral daily  senna 2 Tablet(s) Oral at bedtime  simethicone 80 milliGRAM(s) Chew two times a day  sodium chloride 0.9%. 1000 milliLiter(s) (70 mL/Hr) IV Continuous <Continuous>  sucralfate suspension 1 Gram(s) Oral four times a day    MEDICATIONS  (PRN):  acetaminophen   Tablet .. 650 milliGRAM(s) Oral every 4 hours PRN Mild Pain (1 - 3)  calcium carbonate    500 mG (Tums) Chewable 1 Tablet(s) Chew three times a day PRN Heartburn  ondansetron Injectable 4 milliGRAM(s) IV Push every 6 hours PRN Nausea and/or Vomiting  oxyCODONE    IR 5 milliGRAM(s) Oral every 8 hours PRN Severe Pain (7 - 10)  traMADol 50 milliGRAM(s) Oral every 6 hours PRN Moderate Pain (4 - 6)        Vital Signs Last 24 Hrs  T(C): 36.6 (01 Jun 2021 13:40), Max: 36.8 (31 May 2021 20:34)  T(F): 97.9 (01 Jun 2021 13:40), Max: 98.2 (31 May 2021 20:34)  HR: 74 (01 Jun 2021 13:40) (72 - 74)  BP: 109/69 (01 Jun 2021 13:40) (108/73 - 118/70)  BP(mean): --  RR: 15 (01 Jun 2021 13:40) (15 - 17)  SpO2: 98% (01 Jun 2021 13:40) (98% - 99%)      LABS:                        10.8   8.15  )-----------( 303      ( 01 Jun 2021 06:58 )             33.5     06-01    138  |  104  |  9   ----------------------------<  85  3.5   |  26  |  0.42<L>    Ca    8.7      01 Jun 2021 06:58  Mg     2.0     06-01    TPro  6.8  /  Alb  2.4<L>  /  TBili  0.4  /  DBili  x   /  AST  45<H>  /  ALT  23  /  AlkPhos  205<H>  06-01              Culture - Urine (collected 29 May 2021 20:57)  Source: .Urine Clean Catch (Midstream)  Final Report (30 May 2021 16:00):    <10,000 CFU/mL Normal Urogenital Deepali      COVID-19 PCR: NotDetec (29 May 2021 20:24)         Patient is a 70y old  Male who presents with a chief complaint of Dark stool (01 Jun 2021 07:37)      SUBJECTIVE / OVERNIGHT EVENTS: pt c/o generalized abdominal pain, states pain medication only lasts 3 hours. +flatus, last BM yesterday     [place] #[id number]    MEDICATIONS  (STANDING):  magnesium citrate Oral Solution 1 Bottle Oral once  melatonin 5 milliGRAM(s) Oral at bedtime  pantoprazole  Injectable 40 milliGRAM(s) IV Push two times a day  polyethylene glycol 3350 17 Gram(s) Oral daily  senna 2 Tablet(s) Oral at bedtime  simethicone 80 milliGRAM(s) Chew two times a day  sodium chloride 0.9%. 1000 milliLiter(s) (70 mL/Hr) IV Continuous <Continuous>  sucralfate suspension 1 Gram(s) Oral four times a day    MEDICATIONS  (PRN):  acetaminophen   Tablet .. 650 milliGRAM(s) Oral every 4 hours PRN Mild Pain (1 - 3)  calcium carbonate    500 mG (Tums) Chewable 1 Tablet(s) Chew three times a day PRN Heartburn  ondansetron Injectable 4 milliGRAM(s) IV Push every 6 hours PRN Nausea and/or Vomiting  oxyCODONE    IR 5 milliGRAM(s) Oral every 8 hours PRN Severe Pain (7 - 10)  traMADol 50 milliGRAM(s) Oral every 6 hours PRN Moderate Pain (4 - 6)        Vital Signs Last 24 Hrs  T(C): 36.6 (01 Jun 2021 13:40), Max: 36.8 (31 May 2021 20:34)  T(F): 97.9 (01 Jun 2021 13:40), Max: 98.2 (31 May 2021 20:34)  HR: 74 (01 Jun 2021 13:40) (72 - 74)  BP: 109/69 (01 Jun 2021 13:40) (108/73 - 118/70)  BP(mean): --  RR: 15 (01 Jun 2021 13:40) (15 - 17)  SpO2: 98% (01 Jun 2021 13:40) (98% - 99%)    GEN: NAD; A and O x 3  LUNGS: CTA B/L  HEART: S1 S2  ABDOMEN: firm, tender, non-distended, + BS  EXTREMITIES: no edema  LN: right axillary firm approx 2cm nodule  NERVOUS SYSTEM:  Awake and alert; no focal neuro deficits      LABS:                        10.8   8.15  )-----------( 303      ( 01 Jun 2021 06:58 )             33.5     06-01    138  |  104  |  9   ----------------------------<  85  3.5   |  26  |  0.42<L>    Ca    8.7      01 Jun 2021 06:58  Mg     2.0     06-01    TPro  6.8  /  Alb  2.4<L>  /  TBili  0.4  /  DBili  x   /  AST  45<H>  /  ALT  23  /  AlkPhos  205<H>  06-01      Culture - Urine (collected 29 May 2021 20:57)  Source: .Urine Clean Catch (Midstream)  Final Report (30 May 2021 16:00):    <10,000 CFU/mL Normal Urogenital Deepali      COVID-19 PCR: NotDetec (29 May 2021 20:24)      < from: CT Angio Abdomen and Pelvis w/ IV Cont (05.29.21 @ 19:06) >  IMPRESSION:  1.  Large malignant gastric ulcer within a large gastric mass on the anterior wall of the body of the stomach measuring 6.7 x 3.3 x 4.7 cm.  2.  No evidence of active extravasation of contrast material to suggest acute GI bleed.  3.  Omental carcinomatosis and peritoneal carcinomatosis as described above.  4.  Retroperitoneal LEFT periaortic lymphadenopathy.    < end of copied text >

## 2021-06-01 NOTE — DIETITIAN INITIAL EVALUATION ADULT. - PERTINENT LABORATORY DATA
06-01 Na138 mmol/L Glu 85 mg/dL K+ 3.5 mmol/L Cr  0.42 mg/dL<L> BUN 9 mg/dL   05-30 Phos 2.9 mg/dL   06-01 Alb 2.4 g/dL<L>       05-30 Chol 142 mg/dL LDL --    HDL 42 mg/dL Trig 83 mg/dL  05-30-21 @ 11:29 HgbA1C 5.9 [4.0 - 5.6]

## 2021-06-01 NOTE — PROGRESS NOTE ADULT - ASSESSMENT
69 yo ex-smoker male with recent hx of GIB and ex laparotomy for perforated gastric ulcer that turned out to be gastric cancer and also possible complication of surgery with peritonitis with 2 hospitalizations in [place] in the month of April, presenting today with dark stools since yesterday associated with nausea and lower abdominal pain.  Pt is admitted for LGIB, weight loss. CT abd shows metastatic dx with peritoneal carcinomatosis. Heme/onc [facility] following. Surgery consulted and no acute surgical intervention needed this time. Palliative following. CT chest/head ordered for distal mets. will likely d/c home when CT is negative.

## 2021-06-01 NOTE — PROGRESS NOTE ADULT - ASSESSMENT
69 yo M, ex-smoker, with recent hx of GIB and ex laparotomy for perforated gastric ulcer that turned out to be gastric cancer and also possible complication of surgery with peritonitis with 2 hospitalizations in [place] in the month of April, presenting with melena/GIB. Likely 2/2 metastatic GI cancer.    #Gastric cancer, metastatic  #Carcinomatosis  #GIB  #Anemia  -likely metastatic ca; CEA high, per records, pt has oncologist and surgeon but after discussing with family (daughter Xochitl and son Virgil) switch to [facility].   -outpt records show poorly differentiated adenocarcinoma - reviewed by heme/onc  -per heme/onc may be chemo candidate  -for now c/w protonix, serial CBCs, transfuse for Hgb <7  -start IV iron per heme-onc  -ordered CTH/C to r/o distal mets - if negative, can go home and f/u heme/onc this wk to initiate chemo  -c/w fluids    #Prediabetes  -A1C 5.9%; will likely not require HISS    #DVT PPX  -SCDs    #Dispo  -full code  -medicine for management of anemia 2/2 GIB/gastric CA  -discussed with daughter Xochitl, son Corby

## 2021-06-02 ENCOUNTER — TRANSCRIPTION ENCOUNTER (OUTPATIENT)
Age: 71
End: 2021-06-02

## 2021-06-02 VITALS
DIASTOLIC BLOOD PRESSURE: 96 MMHG | OXYGEN SATURATION: 97 % | TEMPERATURE: 98 F | RESPIRATION RATE: 18 BRPM | SYSTOLIC BLOOD PRESSURE: 96 MMHG | HEART RATE: 96 BPM

## 2021-06-02 LAB
ALBUMIN SERPL ELPH-MCNC: 2.3 G/DL — LOW (ref 3.5–5)
ALP SERPL-CCNC: 212 U/L — HIGH (ref 40–120)
ALT FLD-CCNC: 21 U/L DA — SIGNIFICANT CHANGE UP (ref 10–60)
ANION GAP SERPL CALC-SCNC: 6 MMOL/L — SIGNIFICANT CHANGE UP (ref 5–17)
AST SERPL-CCNC: 45 U/L — HIGH (ref 10–40)
BILIRUB SERPL-MCNC: 0.3 MG/DL — SIGNIFICANT CHANGE UP (ref 0.2–1.2)
BUN SERPL-MCNC: 18 MG/DL — SIGNIFICANT CHANGE UP (ref 7–18)
CALCIUM SERPL-MCNC: 8.2 MG/DL — LOW (ref 8.4–10.5)
CHLORIDE SERPL-SCNC: 105 MMOL/L — SIGNIFICANT CHANGE UP (ref 96–108)
CO2 SERPL-SCNC: 29 MMOL/L — SIGNIFICANT CHANGE UP (ref 22–31)
CREAT SERPL-MCNC: 0.51 MG/DL — SIGNIFICANT CHANGE UP (ref 0.5–1.3)
GLUCOSE BLDC GLUCOMTR-MCNC: 118 MG/DL — HIGH (ref 70–99)
GLUCOSE BLDC GLUCOMTR-MCNC: 191 MG/DL — HIGH (ref 70–99)
GLUCOSE BLDC GLUCOMTR-MCNC: 93 MG/DL — SIGNIFICANT CHANGE UP (ref 70–99)
GLUCOSE BLDC GLUCOMTR-MCNC: 95 MG/DL — SIGNIFICANT CHANGE UP (ref 70–99)
GLUCOSE SERPL-MCNC: 89 MG/DL — SIGNIFICANT CHANGE UP (ref 70–99)
HCT VFR BLD CALC: 32.5 % — LOW (ref 39–50)
HGB BLD-MCNC: 10.5 G/DL — LOW (ref 13–17)
MCHC RBC-ENTMCNC: 28.3 PG — SIGNIFICANT CHANGE UP (ref 27–34)
MCHC RBC-ENTMCNC: 32.3 GM/DL — SIGNIFICANT CHANGE UP (ref 32–36)
MCV RBC AUTO: 87.6 FL — SIGNIFICANT CHANGE UP (ref 80–100)
NRBC # BLD: 0 /100 WBCS — SIGNIFICANT CHANGE UP (ref 0–0)
PLATELET # BLD AUTO: 332 K/UL — SIGNIFICANT CHANGE UP (ref 150–400)
POTASSIUM SERPL-MCNC: 3.5 MMOL/L — SIGNIFICANT CHANGE UP (ref 3.5–5.3)
POTASSIUM SERPL-SCNC: 3.5 MMOL/L — SIGNIFICANT CHANGE UP (ref 3.5–5.3)
PROT SERPL-MCNC: 6.8 G/DL — SIGNIFICANT CHANGE UP (ref 6–8.3)
RBC # BLD: 3.71 M/UL — LOW (ref 4.2–5.8)
RBC # FLD: 13.6 % — SIGNIFICANT CHANGE UP (ref 10.3–14.5)
SODIUM SERPL-SCNC: 140 MMOL/L — SIGNIFICANT CHANGE UP (ref 135–145)
WBC # BLD: 8.05 K/UL — SIGNIFICANT CHANGE UP (ref 3.8–10.5)
WBC # FLD AUTO: 8.05 K/UL — SIGNIFICANT CHANGE UP (ref 3.8–10.5)

## 2021-06-02 PROCEDURE — 85025 COMPLETE CBC W/AUTO DIFF WBC: CPT

## 2021-06-02 PROCEDURE — 70450 CT HEAD/BRAIN W/O DYE: CPT

## 2021-06-02 PROCEDURE — 82272 OCCULT BLD FECES 1-3 TESTS: CPT

## 2021-06-02 PROCEDURE — 82746 ASSAY OF FOLIC ACID SERUM: CPT

## 2021-06-02 PROCEDURE — 84100 ASSAY OF PHOSPHORUS: CPT

## 2021-06-02 PROCEDURE — 80061 LIPID PANEL: CPT

## 2021-06-02 PROCEDURE — 84484 ASSAY OF TROPONIN QUANT: CPT

## 2021-06-02 PROCEDURE — 83540 ASSAY OF IRON: CPT

## 2021-06-02 PROCEDURE — 85610 PROTHROMBIN TIME: CPT

## 2021-06-02 PROCEDURE — 83735 ASSAY OF MAGNESIUM: CPT

## 2021-06-02 PROCEDURE — 86301 IMMUNOASSAY TUMOR CA 19-9: CPT

## 2021-06-02 PROCEDURE — 36415 COLL VENOUS BLD VENIPUNCTURE: CPT

## 2021-06-02 PROCEDURE — 86850 RBC ANTIBODY SCREEN: CPT

## 2021-06-02 PROCEDURE — 83036 HEMOGLOBIN GLYCOSYLATED A1C: CPT

## 2021-06-02 PROCEDURE — 71260 CT THORAX DX C+: CPT

## 2021-06-02 PROCEDURE — 85730 THROMBOPLASTIN TIME PARTIAL: CPT

## 2021-06-02 PROCEDURE — 99239 HOSP IP/OBS DSCHRG MGMT >30: CPT

## 2021-06-02 PROCEDURE — 85045 AUTOMATED RETICULOCYTE COUNT: CPT

## 2021-06-02 PROCEDURE — 82607 VITAMIN B-12: CPT

## 2021-06-02 PROCEDURE — 80048 BASIC METABOLIC PNL TOTAL CA: CPT

## 2021-06-02 PROCEDURE — 82962 GLUCOSE BLOOD TEST: CPT

## 2021-06-02 PROCEDURE — 86900 BLOOD TYPING SEROLOGIC ABO: CPT

## 2021-06-02 PROCEDURE — 86901 BLOOD TYPING SEROLOGIC RH(D): CPT

## 2021-06-02 PROCEDURE — 87086 URINE CULTURE/COLONY COUNT: CPT

## 2021-06-02 PROCEDURE — 86803 HEPATITIS C AB TEST: CPT

## 2021-06-02 PROCEDURE — 87635 SARS-COV-2 COVID-19 AMP PRB: CPT

## 2021-06-02 PROCEDURE — 74174 CTA ABD&PLVS W/CONTRAST: CPT

## 2021-06-02 PROCEDURE — 82728 ASSAY OF FERRITIN: CPT

## 2021-06-02 PROCEDURE — 83550 IRON BINDING TEST: CPT

## 2021-06-02 PROCEDURE — 81001 URINALYSIS AUTO W/SCOPE: CPT

## 2021-06-02 PROCEDURE — 85027 COMPLETE CBC AUTOMATED: CPT

## 2021-06-02 PROCEDURE — 86769 SARS-COV-2 COVID-19 ANTIBODY: CPT

## 2021-06-02 PROCEDURE — 99285 EMERGENCY DEPT VISIT HI MDM: CPT | Mod: 25

## 2021-06-02 PROCEDURE — 84443 ASSAY THYROID STIM HORMONE: CPT

## 2021-06-02 PROCEDURE — 80074 ACUTE HEPATITIS PANEL: CPT

## 2021-06-02 PROCEDURE — 80053 COMPREHEN METABOLIC PANEL: CPT

## 2021-06-02 PROCEDURE — 83615 LACTATE (LD) (LDH) ENZYME: CPT

## 2021-06-02 PROCEDURE — 83690 ASSAY OF LIPASE: CPT

## 2021-06-02 PROCEDURE — 82378 CARCINOEMBRYONIC ANTIGEN: CPT

## 2021-06-02 RX ORDER — ONDANSETRON 8 MG/1
1 TABLET, FILM COATED ORAL
Qty: 21 | Refills: 0
Start: 2021-06-02 | End: 2021-06-08

## 2021-06-02 RX ORDER — SUCRALFATE 1 G
10 TABLET ORAL
Qty: 1200 | Refills: 0
Start: 2021-06-02 | End: 2021-07-01

## 2021-06-02 RX ORDER — OXYCODONE HYDROCHLORIDE 5 MG/1
1 TABLET ORAL
Qty: 9 | Refills: 0
Start: 2021-06-02 | End: 2021-06-04

## 2021-06-02 RX ORDER — PANTOPRAZOLE SODIUM 20 MG/1
1 TABLET, DELAYED RELEASE ORAL
Qty: 30 | Refills: 0
Start: 2021-06-02 | End: 2021-07-01

## 2021-06-02 RX ORDER — CALCIUM CARBONATE 500(1250)
1 TABLET ORAL
Qty: 42 | Refills: 0
Start: 2021-06-02 | End: 2021-06-15

## 2021-06-02 RX ORDER — OMEPRAZOLE 10 MG/1
1 CAPSULE, DELAYED RELEASE ORAL
Qty: 0 | Refills: 0 | DISCHARGE

## 2021-06-02 RX ORDER — SENNA PLUS 8.6 MG/1
2 TABLET ORAL
Qty: 28 | Refills: 0
Start: 2021-06-02 | End: 2021-06-15

## 2021-06-02 RX ORDER — FERROUS SULFATE 325(65) MG
1 TABLET ORAL
Qty: 30 | Refills: 0
Start: 2021-06-02 | End: 2021-07-01

## 2021-06-02 RX ORDER — TRAMADOL HYDROCHLORIDE 50 MG/1
1 TABLET ORAL
Qty: 28 | Refills: 0
Start: 2021-06-02 | End: 2021-06-08

## 2021-06-02 RX ORDER — METOCLOPRAMIDE HCL 10 MG
1 TABLET ORAL
Qty: 0 | Refills: 0 | DISCHARGE

## 2021-06-02 RX ORDER — GABAPENTIN 400 MG/1
1 CAPSULE ORAL
Qty: 0 | Refills: 0 | DISCHARGE

## 2021-06-02 RX ADMIN — PANTOPRAZOLE SODIUM 40 MILLIGRAM(S): 20 TABLET, DELAYED RELEASE ORAL at 05:22

## 2021-06-02 RX ADMIN — OXYCODONE HYDROCHLORIDE 5 MILLIGRAM(S): 5 TABLET ORAL at 01:30

## 2021-06-02 RX ADMIN — Medication 1 GRAM(S): at 11:07

## 2021-06-02 RX ADMIN — SIMETHICONE 80 MILLIGRAM(S): 80 TABLET, CHEWABLE ORAL at 05:23

## 2021-06-02 RX ADMIN — POLYETHYLENE GLYCOL 3350 17 GRAM(S): 17 POWDER, FOR SOLUTION ORAL at 11:09

## 2021-06-02 RX ADMIN — Medication 1 GRAM(S): at 05:23

## 2021-06-02 RX ADMIN — Medication 1 GRAM(S): at 00:51

## 2021-06-02 RX ADMIN — OXYCODONE HYDROCHLORIDE 5 MILLIGRAM(S): 5 TABLET ORAL at 02:16

## 2021-06-02 NOTE — PROGRESS NOTE ADULT - REASON FOR ADMISSION
Dark stool

## 2021-06-02 NOTE — PROGRESS NOTE ADULT - ASSESSMENT
70M with poorly differentiated gastric adenocarcinoma, negative Her-2 with mets and peritoneal carcinomatosis  no evidence of obstruction or bleeding currently    - Continue care as per heme/onc team  - No acute surgical intervention indicated at present time

## 2021-06-02 NOTE — PROGRESS NOTE ADULT - SUBJECTIVE AND OBJECTIVE BOX
INTERVAL HPI/OVERNIGHT EVENTS:  No acute events overnight. Reports hunger and gas pains. +flatus.    MEDICATIONS  (STANDING):  melatonin 5 milliGRAM(s) Oral at bedtime  pantoprazole  Injectable 40 milliGRAM(s) IV Push two times a day  polyethylene glycol 3350 17 Gram(s) Oral daily  senna 2 Tablet(s) Oral at bedtime  sodium chloride 0.9%. 1000 milliLiter(s) (70 mL/Hr) IV Continuous <Continuous>  sucralfate suspension 1 Gram(s) Oral four times a day    MEDICATIONS  (PRN):  acetaminophen   Tablet .. 650 milliGRAM(s) Oral every 4 hours PRN Mild Pain (1 - 3)  calcium carbonate    500 mG (Tums) Chewable 1 Tablet(s) Chew three times a day PRN Heartburn  ondansetron Injectable 4 milliGRAM(s) IV Push every 6 hours PRN Nausea and/or Vomiting  oxyCODONE    IR 5 milliGRAM(s) Oral every 8 hours PRN Severe Pain (7 - 10)  traMADol 50 milliGRAM(s) Oral every 6 hours PRN Moderate Pain (4 - 6)      Vital Signs Last 24 Hrs  T(C): 36.6 (02 Jun 2021 05:43), Max: 36.7 (01 Jun 2021 20:39)  T(F): 97.8 (02 Jun 2021 05:43), Max: 98 (01 Jun 2021 20:39)  HR: 85 (02 Jun 2021 05:43) (74 - 92)  BP: 97/61 (02 Jun 2021 05:43) (97/61 - 109/69)  BP(mean): --  RR: 17 (02 Jun 2021 05:43) (15 - 17)  SpO2: 96% (02 Jun 2021 05:43) (96% - 98%)    Physical:  General: Alert and oriented, not in acute distress  Resp: Breathing unlabored  Abdomen: Soft, nondistended, nontender; upper midline incision      LABS:                      10.5   8.05  )-----------( 332      ( 02 Jun 2021 07:08 )             32.5             06-02    140  |  105  |  18  ----------------------------<  89  3.5   |  29  |  0.51    Ca    8.2<L>      02 Jun 2021 07:07  Mg     2.0     06-01    TPro  6.8  /  Alb  2.3<L>  /  TBili  0.3  /  DBili  x   /  AST  45<H>  /  ALT  21  /  AlkPhos  212<H>  06-02

## 2021-06-02 NOTE — PROGRESS NOTE ADULT - ASSESSMENT
69 yo M, ex-smoker, with recent hx of GIB and ex laparotomy for perforated gastric ulcer that turned out to be gastric cancer and also possible complication of surgery with peritonitis with 2 hospitalizations in [place] in the month of April, presenting with melena/GIB. Likely 2/2 metastatic GI cancer.    #Gastric cancer, metastatic  #Carcinomatosis  #GIB  #Anemia  -likely metastatic ca; CEA high, per records, pt has oncologist and surgeon but after discussing with family (daughter Xochitl and son Virgil) switch to [facility].   -outpt records show poorly differentiated adenocarcinoma - reviewed by heme/onc  -per heme/onc may be chemo candidate  -for now c/w protonix, serial CBCs, transfuse for Hgb <7  -started IV iron per heme-onc  -CTH shows no mets, CT chest with mediastinal/hilar lymph nodes - reviewed personally    #Prediabetes  -A1C 5.9%; will likely not require HISS    #Malnutrition  -Ensure supplements    #DVT PPX  -SCDs    #Dispo  -full code  -medicine for management of anemia 2/2 GIB/gastric CA - DC home today, f/u heme/onc tomorrow to start chemo this vs. next wk

## 2021-06-02 NOTE — PROGRESS NOTE ADULT - SUBJECTIVE AND OBJECTIVE BOX
Janet Magallon MD  Division of Hospital Medicine  24 HOUR EVENTS/ROS: Pt had large loose BM today, with dark black "flecks." Feels bloated, but tolerating PO. Denies F/C, N/V.    MEDICATIONS  (STANDING):  melatonin 5 milliGRAM(s) Oral at bedtime  pantoprazole  Injectable 40 milliGRAM(s) IV Push two times a day  polyethylene glycol 3350 17 Gram(s) Oral daily  senna 2 Tablet(s) Oral at bedtime  sodium chloride 0.9%. 1000 milliLiter(s) (70 mL/Hr) IV Continuous <Continuous>  sucralfate suspension 1 Gram(s) Oral four times a day    MEDICATIONS  (PRN):  acetaminophen   Tablet .. 650 milliGRAM(s) Oral every 4 hours PRN Mild Pain (1 - 3)  calcium carbonate    500 mG (Tums) Chewable 1 Tablet(s) Chew three times a day PRN Heartburn  ondansetron Injectable 4 milliGRAM(s) IV Push every 6 hours PRN Nausea and/or Vomiting  oxyCODONE    IR 5 milliGRAM(s) Oral every 8 hours PRN Severe Pain (7 - 10)  traMADol 50 milliGRAM(s) Oral every 6 hours PRN Moderate Pain (4 - 6)        PHYSICAL EXAM:  Vital Signs Last 24 Hrs  T(C): 36.6 (02 Jun 2021 11:50), Max: 36.7 (01 Jun 2021 20:39)  T(F): 97.8 (02 Jun 2021 11:50), Max: 98 (01 Jun 2021 20:39)  HR: 96 (02 Jun 2021 11:50) (85 - 96)  BP: 96/96 (02 Jun 2021 11:50) (96/96 - 105/72)  BP(mean): --  RR: 18 (02 Jun 2021 11:50) (16 - 18)  SpO2: 97% (02 Jun 2021 11:50) (96% - 98%)    Appearance: thin  male sitting up on bed NAD	  HEENT:   Normal oral mucosa, PERRL, EOMI, pale sclerae	  Lymphatic: No lymphadenopathy  Cardiovascular: Normal S1 S2, No JVD, No murmurs, No edema  Respiratory: Lungs clear to auscultation	  Neuro/psych: Grossly non-focal, CN 2-12 intact, AAOX3, mood and affect normal  Gastrointestinal:  Soft, Non-tender, + BS	well healed vertical scar supraumbilical  Skin: No rashes, No ecchymoses, No cyanosis	  Extremities: Normal range of motion, No clubbing, cyanosis or edema  Vascular: Peripheral pulses palpable 2+ bilaterally    LABS personally reviewed: anemic	 	                        10.5   8.05  )-----------( 332      ( 02 Jun 2021 07:08 )             32.5     06-02    140  |  105  |  18  ----------------------------<  89  3.5   |  29  |  0.51  06-01    138  |  104  |  9   ----------------------------<  85  3.5   |  26  |  0.42<L>    Ca    8.2<L>      02 Jun 2021 07:07  Ca    8.7      01 Jun 2021 06:58  Mg     2.0     06-01    TPro  6.8  /  Alb  2.3<L>  /  TBili  0.3  /  DBili  x   /  AST  45<H>  /  ALT  21  /  AlkPhos  212<H>  06-02  TPro  6.8  /  Alb  2.4<L>  /  TBili  0.4  /  DBili  x   /  AST  45<H>  /  ALT  23  /  AlkPhos  205<H>  06-01        POCT Blood Glucose.: 118 mg/dL (02 Jun 2021 16:40)  POCT Blood Glucose.: 191 mg/dL (02 Jun 2021 11:26)  POCT Blood Glucose.: 95 mg/dL (02 Jun 2021 07:45)  POCT Blood Glucose.: 93 mg/dL (02 Jun 2021 06:00)  POCT Blood Glucose.: 93 mg/dL (01 Jun 2021 23:51)  POCT Blood Glucose.: 135 mg/dL (01 Jun 2021 20:55)  POCT Blood Glucose.: 81 mg/dL (01 Jun 2021 18:00)    BCX/UCX: .Urine Clean Catch (Midstream)  05-29-21   <10,000 CFU/mL Normal Urogenital Deepali  --  --    Consult notes reviewed: heme/onc

## 2021-06-02 NOTE — DISCHARGE NOTE NURSING/CASE MANAGEMENT/SOCIAL WORK - NSDCPEFALRISK_GEN_ALL_CORE
ALL (acute lymphoblastic leukemia)    Asthma    Constipation    Fracture of clavicle  left clavicle fracture  Headache    Language barrier    RAD (reactive airway disease), mild intermittent, uncomplicated
Patient information on fall and injury prevention

## 2021-06-02 NOTE — PROGRESS NOTE ADULT - NUTRITIONAL ASSESSMENT
This patient has been assessed with a concern for Malnutrition and has been determined to have a diagnosis/diagnoses of Severe protein-calorie malnutrition and Underweight (BMI < 19).    This patient is being managed with:   Diet Soft-  Entered: Jun 2 2021  1:10PM

## 2021-06-02 NOTE — PROGRESS NOTE ADULT - ASSESSMENT
complete note to follow   Assessment and Plan:   · Assessment	  71 y/o with surgical explore for gastric perforation and was found to have locally advanced gastric cancer in 4/2021    # Gastric cancer   perforated in 4/2021  s/p surgical intervention at [facility]   tissue diagnosis brought by pt's daughter- poorly differentiated adenocarcinoma with negative Her-2  CT showed metastatic disease with peritoneal carcinomatosis   need to start palliative chemo+immunotherapy ASAP to avoid bowel obstruction   d/w pt and family   need GI clearance for d/c  will f/u in My office on Thursday for preparation of chemotherapy   All d/w carried on with pt and family in  [place]  anemia due to GI bleeding   CT A/P: Large malignant gastric ulcer within a large gastric mass on the anterior wall of the body of the stomach measuring 6.7 x 3.3 x 4.7 cm. No evidence of active extravasation of contrast material to suggest acute GI bleed.Omental carcinomatosis and peritoneal carcinomatosis as described above.Retroperitoneal LEFT periaortic lymphadenopathy.  CEA=9,095, CA 19-9 nl  Rec's:  -Pain mgmt  -parental iron now and continue outpatient    -Chest CT- Hilar adenopathy, B/L Pleural effusions, upper abdominal adenopathy  -f/u with Oncologist Dr. Christopher tomorrow 6/3  -plan to start chemo ASAP  -d/c home today    Thank you for the referral. Will continue to monitor the patient.  Please call with any questions [phone]  Above reviewed with Attending Dr. Christopher

## 2021-06-02 NOTE — PROGRESS NOTE ADULT - PROVIDER SPECIALTY LIST ADULT
Heme/Onc
Hospitalist
Surgery
Heme/Onc
Heme/Onc
Hospitalist
Internal Medicine

## 2021-06-02 NOTE — PROGRESS NOTE ADULT - SUBJECTIVE AND OBJECTIVE BOX
Patient is a 70y old  Male who presents with a chief complaint of Dark stool (02 Jun 2021 08:43)      SUBJECTIVE / OVERNIGHT EVENTS:    ADDITIONAL REVIEW OF SYSTEMS:    MEDICATIONS  (STANDING):  melatonin 5 milliGRAM(s) Oral at bedtime  pantoprazole  Injectable 40 milliGRAM(s) IV Push two times a day  polyethylene glycol 3350 17 Gram(s) Oral daily  senna 2 Tablet(s) Oral at bedtime  sodium chloride 0.9%. 1000 milliLiter(s) (70 mL/Hr) IV Continuous <Continuous>  sucralfate suspension 1 Gram(s) Oral four times a day    MEDICATIONS  (PRN):  acetaminophen   Tablet .. 650 milliGRAM(s) Oral every 4 hours PRN Mild Pain (1 - 3)  calcium carbonate    500 mG (Tums) Chewable 1 Tablet(s) Chew three times a day PRN Heartburn  ondansetron Injectable 4 milliGRAM(s) IV Push every 6 hours PRN Nausea and/or Vomiting  oxyCODONE    IR 5 milliGRAM(s) Oral every 8 hours PRN Severe Pain (7 - 10)  traMADol 50 milliGRAM(s) Oral every 6 hours PRN Moderate Pain (4 - 6)    CAPILLARY BLOOD GLUCOSE        Vital Signs Last 24 Hrs  T(C): 36.6 (02 Jun 2021 11:50), Max: 36.7 (01 Jun 2021 20:39)  T(F): 97.8 (02 Jun 2021 11:50), Max: 98 (01 Jun 2021 20:39)  HR: 96 (02 Jun 2021 11:50) (74 - 96)  BP: 96/96 (02 Jun 2021 11:50) (96/96 - 109/69)  BP(mean): --  RR: 18 (02 Jun 2021 11:50) (15 - 18)  SpO2: 97% (02 Jun 2021 11:50) (96% - 98%)      LABS:                        10.5   8.05  )-----------( 332      ( 02 Jun 2021 07:08 )             32.5     06-02    140  |  105  |  18  ----------------------------<  89  3.5   |  29  |  0.51    Ca    8.2<L>      02 Jun 2021 07:07  Mg     2.0     06-01    TPro  6.8  /  Alb  2.3<L>  /  TBili  0.3  /  DBili  x   /  AST  45<H>  /  ALT  21  /  AlkPhos  212<H>  06-02          COVID-19 PCR: NotDetec (29 May 2021 20:24)      RADIOLOGY & ADDITIONAL TESTS:  < from: CT Chest w/ IV Cont (06.01.21 @ 19:10) >  EXAM:  CT CHEST IC                            PROCEDURE DATE:  06/01/2021          INTERPRETATION:  CLINICAL INFORMATION: 70 years  Male with gastric ca r/o lung mets,.    COMPARISON: 5/29/2021    CONTRAST/COMPLICATIONS:  IV Contrast: Omnipaque 350 40 cc administered   60 cc discarded  Oral Contrast: NONE  Complications: None reported at time of study completion    PROCEDURE:  CT of the Chest was performed.  Sagittal and coronal reformats were performed.    FINDINGS:    LUNGS AND AIRWAYS: Patent central airways.  Moderate centrilobular and paraseptal emphysema. Dominant 4.4 cm left apical bulla. Bilateral lower lobe dependent atelectasis. Right middle lobe subpleural lines. Bilateral lower lobe passive atelectasis.  PLEURA: Moderate right and small left pleural effusion.  MEDIASTINUM AND ALFREDO:  2.4 x 2.0 cm superior mediastinal lymph node (3:45).  2.6 x1.8 cm right lower paratracheal lymph node (3:56).  3.5 x 2.0 cm precarinal lymph node.  2.4 cm right hilar lymph node (3:69).  2.3 x 1.9 cm AP window lymph node (3:57).  2.4 x 1.5 cm left hilar lymph node (3:61).  VESSELS: Within normal limits.  HEART: Heart size is normal. No pericardial effusion.  CHEST WALL AND LOWER NECK: Within normal limits.  VISUALIZED UPPER ABDOMEN: Hepatic cysts. Portacaval and peripancreatic adenopathy. Small ascites. Anterior gastric wall mass.  BONES: Within normal limits.    IMPRESSION:  History: Hilar adenopathy, presumably metastatic.    Bilateral pleural effusions with underlying compressive atelectasis of the lower lobes.    Emphysema with dominant left apical bulla.    Upper abdominal adenopathy. Please refer to recent CT abdomen and pelvis.      < end of copied text >   Patient is a 70y old  Male who presents with a chief complaint of Dark stool (02 Jun 2021 08:43)      SUBJECTIVE / OVERNIGHT EVENTS: events noted. Pain well-controlled, pt eating solid food without complaints      MEDICATIONS  (STANDING):  melatonin 5 milliGRAM(s) Oral at bedtime  pantoprazole  Injectable 40 milliGRAM(s) IV Push two times a day  polyethylene glycol 3350 17 Gram(s) Oral daily  senna 2 Tablet(s) Oral at bedtime  sodium chloride 0.9%. 1000 milliLiter(s) (70 mL/Hr) IV Continuous <Continuous>  sucralfate suspension 1 Gram(s) Oral four times a day    MEDICATIONS  (PRN):  acetaminophen   Tablet .. 650 milliGRAM(s) Oral every 4 hours PRN Mild Pain (1 - 3)  calcium carbonate    500 mG (Tums) Chewable 1 Tablet(s) Chew three times a day PRN Heartburn  ondansetron Injectable 4 milliGRAM(s) IV Push every 6 hours PRN Nausea and/or Vomiting  oxyCODONE    IR 5 milliGRAM(s) Oral every 8 hours PRN Severe Pain (7 - 10)  traMADol 50 milliGRAM(s) Oral every 6 hours PRN Moderate Pain (4 - 6)        Vital Signs Last 24 Hrs  T(C): 36.6 (02 Jun 2021 11:50), Max: 36.7 (01 Jun 2021 20:39)  T(F): 97.8 (02 Jun 2021 11:50), Max: 98 (01 Jun 2021 20:39)  HR: 96 (02 Jun 2021 11:50) (74 - 96)  BP: 96/96 (02 Jun 2021 11:50) (96/96 - 109/69)  BP(mean): --  RR: 18 (02 Jun 2021 11:50) (15 - 18)  SpO2: 97% (02 Jun 2021 11:50) (96% - 98%)      GEN: NAD; A and O x 3  LUNGS: CTA B/L  HEART: S1 S2  ABDOMEN: firm, tender, non-distended, + BS  EXTREMITIES: no edema  LN: right axillary firm approx 2cm nodule  NERVOUS SYSTEM:  Awake and alert; no focal neuro deficits      LABS:                        10.5   8.05  )-----------( 332      ( 02 Jun 2021 07:08 )             32.5     06-02    140  |  105  |  18  ----------------------------<  89  3.5   |  29  |  0.51    Ca    8.2<L>      02 Jun 2021 07:07  Mg     2.0     06-01    TPro  6.8  /  Alb  2.3<L>  /  TBili  0.3  /  DBili  x   /  AST  45<H>  /  ALT  21  /  AlkPhos  212<H>  06-02      COVID-19 PCR: NotDetec (29 May 2021 20:24)      RADIOLOGY & ADDITIONAL TESTS:  < from: CT Chest w/ IV Cont (06.01.21 @ 19:10) >  EXAM:  CT CHEST IC                            PROCEDURE DATE:  06/01/2021          INTERPRETATION:  CLINICAL INFORMATION: 70 years  Male with gastric ca r/o lung mets,.    COMPARISON: 5/29/2021    CONTRAST/COMPLICATIONS:  IV Contrast: Omnipaque 350 40 cc administered   60 cc discarded  Oral Contrast: NONE  Complications: None reported at time of study completion    PROCEDURE:  CT of the Chest was performed.  Sagittal and coronal reformats were performed.    FINDINGS:    LUNGS AND AIRWAYS: Patent central airways.  Moderate centrilobular and paraseptal emphysema. Dominant 4.4 cm left apical bulla. Bilateral lower lobe dependent atelectasis. Right middle lobe subpleural lines. Bilateral lower lobe passive atelectasis.  PLEURA: Moderate right and small left pleural effusion.  MEDIASTINUM AND ALFREDO:  2.4 x 2.0 cm superior mediastinal lymph node (3:45).  2.6 x1.8 cm right lower paratracheal lymph node (3:56).  3.5 x 2.0 cm precarinal lymph node.  2.4 cm right hilar lymph node (3:69).  2.3 x 1.9 cm AP window lymph node (3:57).  2.4 x 1.5 cm left hilar lymph node (3:61).  VESSELS: Within normal limits.  HEART: Heart size is normal. No pericardial effusion.  CHEST WALL AND LOWER NECK: Within normal limits.  VISUALIZED UPPER ABDOMEN: Hepatic cysts. Portacaval and peripancreatic adenopathy. Small ascites. Anterior gastric wall mass.  BONES: Within normal limits.    IMPRESSION:  History: Hilar adenopathy, presumably metastatic.    Bilateral pleural effusions with underlying compressive atelectasis of the lower lobes.    Emphysema with dominant left apical bulla.    Upper abdominal adenopathy. Please refer to recent CT abdomen and pelvis.      < end of copied text >

## 2021-06-02 NOTE — DISCHARGE NOTE NURSING/CASE MANAGEMENT/SOCIAL WORK - PATIENT PORTAL LINK FT
You can access the FollowMyHealth Patient Portal offered by [facility] by registering at the following website: [email or web address]. By joining [employer] [facility] [employer] [facility]’s FollowMyHealth portal, you will also be able to view your health information using other applications (apps) compatible with our system.

## 2021-06-12 NOTE — DIETITIAN INITIAL EVALUATION ADULT. - WEIGHT FOR BMI (KG)
Pt and family provided with discharge instructions, fever dosing sheet, prescription and follow up information. Pt breathing even and non labored. Vitals stable. Pt ok to be dc'd per er np. Pt neda po. Mother denies any questions at this time.    48.5

## 2025-03-06 NOTE — CONSULT NOTE ADULT - ASSESSMENT
Riley JONES Pain Nurse (supporting Nalini Resendez, APRN CNP)2 hours ago (1:45 PM)     Just sending reminder to refill my Tizanidine before weekend.  Almost out.     Thanks Riley Daugherty to [facility] pool for prep.       Salome Eddy RN      71 y/o developed perforated gastric ulcer s/p emergency surgery and was found to have gastric cancer [place] state. Current in [facility] for further evaluation. admit for GI bleeding and abd pain . 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